# Patient Record
Sex: MALE | Race: WHITE
[De-identification: names, ages, dates, MRNs, and addresses within clinical notes are randomized per-mention and may not be internally consistent; named-entity substitution may affect disease eponyms.]

---

## 2018-05-25 ENCOUNTER — HOSPITAL ENCOUNTER (INPATIENT)
Dept: HOSPITAL 74 - FER | Age: 53
LOS: 6 days | Discharge: HOME | DRG: 190 | End: 2018-05-31
Attending: SPECIALIST | Admitting: SPECIALIST
Payer: COMMERCIAL

## 2018-05-25 VITALS — BODY MASS INDEX: 30.7 KG/M2

## 2018-05-25 DIAGNOSIS — R00.0: ICD-10-CM

## 2018-05-25 DIAGNOSIS — J18.9: ICD-10-CM

## 2018-05-25 DIAGNOSIS — K21.9: ICD-10-CM

## 2018-05-25 DIAGNOSIS — E87.1: ICD-10-CM

## 2018-05-25 DIAGNOSIS — J45.901: ICD-10-CM

## 2018-05-25 DIAGNOSIS — F17.210: ICD-10-CM

## 2018-05-25 DIAGNOSIS — F10.239: ICD-10-CM

## 2018-05-25 DIAGNOSIS — E78.5: ICD-10-CM

## 2018-05-25 DIAGNOSIS — J44.1: Primary | ICD-10-CM

## 2018-05-25 DIAGNOSIS — J20.9: ICD-10-CM

## 2018-05-25 DIAGNOSIS — B37.0: ICD-10-CM

## 2018-05-25 DIAGNOSIS — R79.89: ICD-10-CM

## 2018-05-25 LAB
ALBUMIN SERPL-MCNC: 4.4 G/DL (ref 3.5–5)
ALP SERPL-CCNC: 49 U/L (ref 32–92)
ALT SERPL-CCNC: 47 U/L (ref 10–40)
ANION GAP SERPL CALC-SCNC: 7 MMOL/L (ref 8–16)
AST SERPL-CCNC: 70 U/L (ref 10–42)
BASOPHILS # BLD: 1.8 % (ref 0–2)
BILIRUB SERPL-MCNC: 1.1 MG/DL (ref 0.2–1)
BUN SERPL-MCNC: 14 MG/DL (ref 7–18)
CALCIUM SERPL-MCNC: 9 MG/DL (ref 8.4–10.2)
CHLORIDE SERPL-SCNC: 101 MMOL/L (ref 98–107)
CO2 SERPL-SCNC: 26 MMOL/L (ref 22–28)
CREAT SERPL-MCNC: 1.2 MG/DL (ref 0.6–1.3)
DEPRECATED RDW RBC AUTO: 12.1 % (ref 11.9–15.9)
EOSINOPHIL # BLD: 2.1 % (ref 0–4.5)
GLUCOSE SERPL-MCNC: 151 MG/DL (ref 74–106)
HCT VFR BLD CALC: 45.7 % (ref 35.4–49)
HGB BLD-MCNC: 15.9 GM/DL (ref 11.7–16.9)
LYMPHOCYTES # BLD: 17.7 % (ref 8–40)
MAGNESIUM SERPL-MCNC: 2 MG/DL (ref 1.8–2.4)
MCH RBC QN AUTO: 31.7 PG (ref 25.7–33.7)
MCHC RBC AUTO-ENTMCNC: 34.9 G/DL (ref 32–35.9)
MCV RBC: 90.9 FL (ref 80–96)
MONOCYTES # BLD AUTO: 10.3 % (ref 3.8–10.2)
NEUTROPHILS # BLD: 68.1 % (ref 42.8–82.8)
PLATELET # BLD AUTO: 196 K/MM3 (ref 134–434)
PMV BLD: 8.7 FL (ref 7.5–11.1)
POTASSIUM SERPLBLD-SCNC: 3.7 MMOL/L (ref 3.5–5.1)
PROT SERPL-MCNC: 7.1 G/DL (ref 6.4–8.3)
RBC # BLD AUTO: 5.02 M/MM3 (ref 4–5.6)
SODIUM SERPL-SCNC: 134 MMOL/L (ref 136–145)
WBC # BLD AUTO: 10.4 K/MM3 (ref 4–10.8)

## 2018-05-25 PROCEDURE — HZ2ZZZZ DETOXIFICATION SERVICES FOR SUBSTANCE ABUSE TREATMENT: ICD-10-PCS | Performed by: SPECIALIST

## 2018-05-25 RX ADMIN — PANTOPRAZOLE SODIUM SCH MG: 40 TABLET, DELAYED RELEASE ORAL at 20:41

## 2018-05-25 RX ADMIN — METHYLPREDNISOLONE SODIUM SUCCINATE SCH: 40 INJECTION, POWDER, FOR SOLUTION INTRAMUSCULAR; INTRAVENOUS at 21:15

## 2018-05-25 RX ADMIN — BUDESONIDE AND FORMOTEROL FUMARATE DIHYDRATE SCH PUFF: 80; 4.5 AEROSOL RESPIRATORY (INHALATION) at 22:16

## 2018-05-25 NOTE — PDOC
History of Present Illness





- General


History Source: Patient, Family


Exam Limitations: No Limitations





<Onel Arteaga - Last Filed: 05/25/18 16:31>





- General


History Source: Patient, Family, Old Records


Exam Limitations: No Limitations





- History of Present Illness


Initial Comments: 


05/25/18 16:10


The patient is a 52 year old male with a past medical history of smoking, asthma

, and hyperlipidemia who presents to the emergency department with difficulty 

breathing, cough, and abdominal pain for 1 day. The patient states that 4 weeks 

ago he had similar symptoms and went to his primary doctor, Dr. Gibbons. At 

that time he was prescribed a course of antibiotics, a Z-precious for 3 days, and 

prednisone for 6 days. He reports that his symptoms improved but never 

completely resolved. Today he reports that he woke up with difficulty breathing

, cough, and abdominal pain. He describes his abdominal pain as acuña along 

the inferior border of his left ribs. 





<Aaron Singh - Last Filed: 05/25/18 17:16>





- General


Chief Complaint: Shortness of Breath


Stated Complaint: COUGH,


Time Seen by Provider: 05/25/18 14:34





Past History





- Past Medical History


Asthma: Yes


COPD: No


Psychiatric Problems: Yes (drug and etoh abuse)





- Suicide/Smoking/Psychosocial Hx


Smoking Status: Yes


Smoking History: Current every day smoker


Have you smoked in the past 12 months: Yes


Number of Cigarettes Smoked Daily: 30


Information on smoking cessation initiated: Yes


'Breaking Loose' booklet given: 05/25/18


Hx Alcohol Use: Yes (gallon wine per day)


Drug/Substance Use Hx: Yes (xanax, cocaine)


Substance Use Type: Alcohol, Cocaine, Tranquilizers





<Onel Arteaga - Last Filed: 05/25/18 16:31>





<Aaron Singh - Last Filed: 05/25/18 17:16>





- Past Medical History


Allergies/Adverse Reactions: 


 Allergies











Allergy/AdvReac Type Severity Reaction Status Date / Time


 


No Known Allergies Allergy   Verified 05/25/18 14:34











Home Medications: 


Ambulatory Orders





Albuterol Sulfate Inhaler - [Ventolin HFA Inhaler -] 2 inh PO Q4H PRN #1 inh 03/

09/15 


Fluticasone/Salmeterol [Advair Hfa 230-21 Mcg Inhaler] 1 inh PO BID #1 inhaler 

03/09/15 


Montelukast Sodium [Singulair] 10 mg PO DAILY 05/25/18 











**Review of Systems





- Review of Systems


Able to Perform ROS?: Yes


Comments:: 


05/25/18 16:10


GENERAL/CONSTITUTIONAL: No fever or chills. No weakness.


HEAD, EYES, EARS, NOSE AND THROAT: No change in vision. No ear pain or 

discharge. No sore throat.


CARDIOVASCULAR: No chest pain.


RESPIRATORY: (+) cough, difficulty breathing.


GASTROINTESTINAL: (+) Abdominal pain. No nausea, vomiting, diarrhea or 

constipation.


GENITOURINARY: No dysuria, frequency, or change in urination.


MUSCULOSKELETAL: No joint or muscle swelling or pain. No neck or back pain.


SKIN: No rash


NEUROLOGIC: No headache, vertigo, loss of consciousness, or change in strength/

sensation.


ENDOCRINE: No increased thirst. No abnormal weight change.


HEMATOLOGIC/LYMPHATIC: No anemia, easy bleeding, or history of blood clots.


ALLERGIC/IMMUNOLOGIC: No hives or skin allergy.








<Aaron Singh - Last Filed: 05/25/18 17:16>





*Physical Exam





- Vital Signs


 Last Vital Signs











Temp Pulse Resp BP Pulse Ox


 


 98.4 F   103 H  24   126/82   98 


 


 05/25/18 14:33  05/25/18 14:33  05/25/18 14:33  05/25/18 14:33  05/25/18 14:49














<Onel Arteaga - Last Filed: 05/25/18 16:31>





- Vital Signs


 Last Vital Signs











Temp Pulse Resp BP Pulse Ox


 


 98.4 F   111 H  22   121/81   98 


 


 05/25/18 14:33  05/25/18 16:03  05/25/18 16:03  05/25/18 16:03  05/25/18 16:03














- Physical Exam


Comments: 


05/25/18 16:11


GENERAL: Awake, alert, and fully oriented, in no acute distress


HEAD: No signs of trauma


EYES: PERRLA, EOMI, sclera anicteric, conjunctiva clear


ENT: Auricles normal inspection, hearing grossly normal, nares patent, 

oropharynx clear without exudates. Moist mucosa


NECK: Normal ROM, supple, no lymphadenopathy, JVD, or masses


LUNGS: (+) Diffuse expiratory wheezes bilaterally, Mildly tachypneic  


HEART: Regular rate and rhythm, normal S1 and S2, no murmurs, rubs or gallops


ABDOMEN: Soft, nontender, normoactive bowel sounds.  No guarding, no rebound.  

No masses


EXTREMITIES: Normal range of motion, no edema.  No clubbing or cyanosis. No 

cords, erythema, or tenderness


NEUROLOGICAL: Cranial nerves II through XII grossly intact.  Normal speech, 

normal gait


SKIN: Warm, Dry, normal turgor, no rashes or lesions noted.





<Aaron Singh - Last Filed: 05/25/18 17:16>





**Heart Score/ECG Review


  ** #1


ECG reviewed & interpreted by me at: 15:35





05/25/18 16:10


, no std/tree, T wave flat III, normal axis, normal intervals,  

msec





<Onel Arteaga - Last Filed: 05/25/18 16:31>





ED Treatment Course





- LABORATORY


CBC & Chemistry Diagram: 


 05/25/18 15:49





 05/25/18 15:49





- ADDITIONAL ORDERS


Additional order review: 


 











  05/25/18





  15:49


 


RBC  5.02


 


MCV  90.9


 


MCHC  34.9


 


RDW  12.1


 


MPV  8.7


 


Neutrophils %  68.1


 


Lymphocytes %  17.7


 


Monocytes %  10.3 H


 


Eosinophils %  2.1


 


Basophils %  1.8














- RADIOLOGY


Radiology Studies Ordered: 














 Category Date Time Status


 


 CHEST X-RAY PORTABLE* [RAD] Stat Radiology  05/25/18 14:53 Taken














- Medications


Given in the ED: 


ED Medications














Discontinued Medications














Generic Name Dose Route Start Last Admin





  Trade Name Freq  PRN Reason Stop Dose Admin


 


Albuterol/Ipratropium  3 amp  05/25/18 14:53  05/25/18 14:54





  Duoneb -  NEB  05/25/18 14:54  3 amp





  ONCE ONE   Administration





     





     





     





     














<Onel Arteaga - Last Filed: 05/25/18 16:31>





- LABORATORY


CBC & Chemistry Diagram: 


 05/25/18 15:49





 05/25/18 15:49





- ADDITIONAL ORDERS


Additional order review: 


 











  05/25/18





  15:49


 


RBC  5.02


 


MCV  90.9


 


MCHC  34.9


 


RDW  12.1


 


MPV  8.7


 


Neutrophils %  68.1


 


Lymphocytes %  17.7


 


Monocytes %  10.3 H


 


Eosinophils %  2.1


 


Basophils %  1.8














- RADIOLOGY


Radiograph Interpretation: 


05/25/18 17:16


EXAM#: TYPE/EXAM: RESULT: [id number] RAD/CHEST X-RAY PORTABLE* Indication: 

Cough. Wheezing. Technique: Single AP portable view of the chest. Comparison: 8/ 14/2014 chest x-ray. Findings: There is no evidence of acute infiltrate, 

pulmonary vascular congestion or pleural effusion. There is no definable 

pneumothorax. Normal size and contours of the cardiomediastinal silhouette. The 

thoracic aorta is mildly uncoiled. No abnormal deviation of the trachea. There 

is acromioclavicular arthropathy. Impression: No evidence of acute infiltrate, 

pulmonary vascular congestion or pleural effusion. Reported By: Magda Sanchez DO 05/25/18 1621 





- Medications


Given in the ED: 


ED Medications














Discontinued Medications














Generic Name Dose Route Start Last Admin





  Trade Name Freq  PRN Reason Stop Dose Admin


 


Albuterol/Ipratropium  3 amp  05/25/18 14:53  05/25/18 14:54





  Duoneb -  NEB  05/25/18 14:54  3 amp





  ONCE ONE   Administration





     





     





     





     


 


Chlordiazepoxide HCl  50 mg  05/25/18 15:59  05/25/18 16:02





  Librium -  PO  05/25/18 16:00  50 mg





  ONCE ONE   Administration





     





     





     





     


 


Methylprednisolone Sodium Succinate  125 mg  05/25/18 15:59  05/25/18 16:02





  Solu-Medrol -  IVPUSH  05/25/18 16:00  125 mg





  ONCE ONE   Administration





     





     





     





     


 


Prednisone  60 mg  05/25/18 14:53  05/25/18 15:55





  Deltasone -  PO  05/25/18 14:54  Not Given





  ONCE ONE   





     





     





     





     














<Aaron Singh - Last Filed: 05/25/18 17:16>





Medical Decision Making





- Medical Decision Making





05/25/18 16:06





A portion of this note was documented by scribe services under my direction. I 

have reviewed the details of the note, within reason, and agree with the 

documentation with the following case summary and management plan written by 

me. 





Patient treated in the ED.





Nursing notes are reviewed and incorporated into the medical decision-making.


Vital signs reviewed.





Peripheral IV access obtained by the nurse, laboratory studies are drawn and 

sent, reviewed and interpreted by myself. 





 Vital Signs











Temp Pulse Resp BP Pulse Ox


 


 98.4 F   111 H  22   121/81   98 


 


 05/25/18 14:33  05/25/18 16:03  05/25/18 16:03  05/25/18 16:03  05/25/18 16:03








52-year-old male with past medical history of alcohol abuse, hyperlipidemia, 

asthma, current smoker presents with worsening coughing and wheezing. The 

patient reports one month of the symptoms. One month ago, the patient was 

prescribed prednisone and was taking albuterol with some improvement in 

symptoms. However in the last 2 days, the patient has reported worsening 

productive cough with difficulty breathing and reproducible chest pain with 

cough. Denies fevers or chills. States has become increasingly more difficult 

to breathe despite taking albuterol.





I suspect patient likely having an asthma or COPD exacerbation with probable 

underlying pneumonia. We'll start doing nebs in addition to steroids and 

antibiotics. Patient denies alcohol draws at this time but will treat him 

prophylactically with Librium. If the symptoms do not improve, the patient will 

need to be admitted to the hospital for further evaluation.


05/25/18 16:31





Chest xray reviewed. No acute findings.





 CBC, BMP





 05/25/18 15:49 





 05/25/18 15:49 





 CMP











Sodium  134 mmol/L (136-145)  L  05/25/18  15:49    


 


Potassium  3.7 mmol/L (3.5-5.1)   05/25/18  15:49    


 


Chloride  101 mmol/L ()   05/25/18  15:49    


 


Carbon Dioxide  26 mmol/L (22-28)   05/25/18  15:49    


 


Anion Gap  7  (8-16)  L  05/25/18  15:49    


 


BUN  14 mg/dl (7-18)   05/25/18  15:49    


 


Creatinine  1.2 mg/dl (0.6-1.3)   05/25/18  15:49    


 


Creat Clearance w eGFR  > 60  (>60)   05/25/18  15:49    


 


Random Glucose  151 mg/dl ()  H  05/25/18  15:49    


 


Calcium  9.0 mg/dl (8.4-10.2)   05/25/18  15:49    


 


Magnesium  2.0 mg/dL (1.8-2.4)   05/25/18  15:49    


 


Total Bilirubin  1.1 mg/dl (0.2-1.0)  H  05/25/18  15:49    


 


AST  70 U/L (10-42)  H  05/25/18  15:49    


 


ALT  47 U/L (10-40)  H  05/25/18  15:49    


 


Alkaline Phosphatase  49 U/L (32-92)   05/25/18  15:49    


 


Troponin I  < 0.03 ng/ml (0.00-0.06)   05/25/18  15:49    


 


Total Protein  7.1 g/dl (6.4-8.3)   05/25/18  15:49    


 


Albumin  4.4 g/dl (3.5-5.0)   05/25/18  15:49    








The patient continues to wheeze despite the treatments. Given the symptoms, 

decision was made to admit the patient. I had spoken with the patient, who 

agrees with admission to the hospital. Case discussed with Dr. Gibbons. Given 

that the patient will be on librium and with asthma/COPD exacerbation, decision 

was made to admit patient to telemetry at [facility].





Case discussed in detail with admitting physician including history, physical 

exam and ancillary studies.





Admitting physician has assumed care for the patient, will follow all pending 

diagnostics and will complete the evaluation and treatment.  





<Onel Arteaga - Last Filed: 05/25/18 16:31>





*DC/Admit/Observation/Transfer





- Discharge Dispostion


Decision to Admit order: Yes





<Onel Arteaga - Last Filed: 05/25/18 16:31>





- Attestations


Scribe Attestion: 


05/25/18 16:11


Documentation prepared by Aaron Singh, acting as medical scribe for Onel Arteaga MD.





<Aaron Singh - Last Filed: 05/25/18 17:16>


Diagnosis at time of Disposition: 


Acute asthma exacerbation


Qualifiers:


 Asthma severity: unspecified severity Asthma persistence: unspecified 

Qualified Code(s): J45.901 - Unspecified asthma with (acute) exacerbation





PNA (pneumonia)


Qualifiers:


 Pneumonia type: due to unspecified organism Laterality: unspecified laterality 

Lung location: unspecified part of lung Qualified Code(s): J18.9 - Pneumonia, 

unspecified organism








- Discharge Dispostion


Condition at time of disposition: Stable

## 2018-05-25 NOTE — HP
Admitting History and Physical





- Primary Care Physician


PCP: José Gibbons





- Admission


Chief Complaint: Cough, wheezing


History of Present Illness: 





Pt with signigificant Hx/o Asthma, smoker, alcohol abuse (one gallon of wine/ 

beer per day) started to cough, wheeze 2 days ago; pt got worse and today came 

to ER were was found to have acute asthma exacerbation, probable PNA.


History Source: Patient





- Past Medical History


Cardiovascular: Yes: Hyperlipdemia


Pulmonary: Yes: Asthma





- Smoking History


Smoking history: Current every day smoker


Have you smoked in the past 12 months: Yes


Aproximately how many cigarettes per day: 30





- Alcohol/Substance Use


Hx Alcohol Use: Yes (gallon wine per day)





Home Medications





- Allergies


Allergies/Adverse Reactions: 


 Allergies











Allergy/AdvReac Type Severity Reaction Status Date / Time


 


No Known Allergies Allergy   Verified 05/25/18 14:34














- Home Medications


Home Medications: 


Ambulatory Orders





Albuterol Sulfate Inhaler - [Ventolin HFA Inhaler -] 2 inh PO Q4H PRN #1 inh 03/

09/15 


Fluticasone/Salmeterol [Advair Hfa 230-21 Mcg Inhaler] 1 inh PO BID #1 inhaler 

03/09/15 


Montelukast Sodium [Singulair] 10 mg PO DAILY 05/25/18 











Review of Systems





- Review of Systems


Constitutional: reports: Chills, Diaphoresis, Weakness


Eyes: denies: Blurred Vision


HENT: reports: Difficult Swallowing.  denies: Ear Discharge, Ear Pain, Nasal 

Congestion, Throat Pain


Neck: denies: Pain on Movement, Stiffness


Cardiovascular: reports: Palpitations (heart betting fast; no dizziness or SOB 

or CP  associated with palpitations).  denies: Chest Pain, Edema


Respiratory: reports: Cough, Wheezing


Gastrointestinal: reports: Abdominal Pain (LUQ when coughing)


Genitourinary: denies: Burning, Discharge


Musculoskeletal: reports: Joint Pain (from falling when gets drunk).  denies: 

Back Pain


Integumentary: reports: Bruising (from falling when gets drunk).  denies: Eczema

, Rash


Neurological: denies: Change in LOC, Change in Speech, Incoordination, Numbness


Endocrine: denies: Excessive Sweating, Intolerance to Cold


Hematology/Lymphatic: denies: Easily Bruised, Excessive Bleeding


Psychiatric: denies: Altered Sleep Pattern, Anxiety, Hallucinations





Physical Examination


Vital Signs: 


 Vital Signs











Temperature  99.4 F   05/25/18 19:25


 


Pulse Rate  105 H  05/25/18 19:25


 


Respiratory Rate  20   05/25/18 19:25


 


Blood Pressure  134/78   05/25/18 19:25


 


O2 Sat by Pulse Oximetry (%)  97   05/25/18 18:21











Constitutional: Yes: No Distress, Calm


Eyes: Yes: Conjunctiva Clear, EOM Intact


HENT: Yes: Thrush, Other.  No: Drooling, Rhinnorhea


Neck: Yes: Trachea Midline.  No: Lymphadenopathy


Cardiovascular: Yes: Tachycardia, S1, S2


Respiratory: Yes: Regular, CTA Bilaterally, Wheezes


Gastrointestinal: Yes: Normal Bowel Sounds, Soft.  No: Tenderness


...Rectal Exam: Yes: Deferred


Extremities: Yes: Other (multiple bruses on the legs, arma)


Edema: No


Neurological: Yes: Alert, Oriented, Other (symmetric motor and sensory 

examnation in UE/ LE/ face)


...Motor Strength: WNL


Psychiatric: Yes: Alert, Oriented


Labs: 


 CBC, BMP





 05/25/18 15:49 





 05/25/18 15:49 











Imaging





- Results


Chest X-ray: Report Reviewed





Problem List





- Problems


(1) Alcohol abuse


Code(s): F10.10 - ALCOHOL ABUSE, UNCOMPLICATED   





(2) Acute asthma exacerbation


Code(s): J45.901 - UNSPECIFIED ASTHMA WITH (ACUTE) EXACERBATION   


Qualifiers: 


   Asthma severity: unspecified severity   Asthma persistence: unspecified   

Qualified Code(s): J45.901 - Unspecified asthma with (acute) exacerbation   





(3) PNA (pneumonia)


Code(s): J18.9 - PNEUMONIA, UNSPECIFIED ORGANISM   


Qualifiers: 


   Pneumonia type: due to unspecified organism   Laterality: unspecified 

laterality   Lung location: unspecified part of lung   Qualified Code(s): J18.9 

- Pneumonia, unspecified organism   





(4) Tachycardia


Code(s): R00.0 - TACHYCARDIA, UNSPECIFIED   





(5) Thrush, oral


Code(s): B37.0 - CANDIDAL STOMATITIS   





(6) Hyponatremia


Assessment/Plan: 


borderline


Code(s): E87.1 - HYPO-OSMOLALITY AND HYPONATREMIA   





(7) GERD (gastroesophageal reflux disease)


Code(s): K21.9 - GASTRO-ESOPHAGEAL REFLUX DISEASE WITHOUT ESOPHAGITIS   





(8) Elevated LFTs


Code(s): R79.89 - OTHER SPECIFIED ABNORMAL FINDINGS OF BLOOD CHEMISTRY   





Assessment/Plan





Admit to monitor bed (pt with respiratory disease, requires Librium for alcohol 

withdrawing; to monitor for respiratory decompensation)


IV solu-medrol


DUoned nebulized


Pulmonary consult


Nystatin oral solution


AM labs


Case was d/w pt's nurse

## 2018-05-26 LAB
ALBUMIN SERPL-MCNC: 3.6 G/DL (ref 3.4–5)
ALP SERPL-CCNC: 52 U/L (ref 45–117)
ALT SERPL-CCNC: 49 U/L (ref 12–78)
ANION GAP SERPL CALC-SCNC: 9 MMOL/L (ref 8–16)
AST SERPL-CCNC: 43 U/L (ref 15–37)
BILIRUB SERPL-MCNC: 1 MG/DL (ref 0.2–1)
BUN SERPL-MCNC: 13 MG/DL (ref 7–18)
CALCIUM SERPL-MCNC: 8.5 MG/DL (ref 8.5–10.1)
CHLORIDE SERPL-SCNC: 106 MMOL/L (ref 98–107)
CO2 SERPL-SCNC: 27 MMOL/L (ref 21–32)
CREAT SERPL-MCNC: 1.1 MG/DL (ref 0.7–1.3)
DEPRECATED RDW RBC AUTO: 12.8 % (ref 11.9–15.9)
GLUCOSE SERPL-MCNC: 260 MG/DL (ref 74–106)
HCT VFR BLD CALC: 43.8 % (ref 35.4–49)
HGB BLD-MCNC: 14.9 GM/DL (ref 11.7–16.9)
MCH RBC QN AUTO: 31.7 PG (ref 25.7–33.7)
MCHC RBC AUTO-ENTMCNC: 34 G/DL (ref 32–35.9)
MCV RBC: 93.1 FL (ref 80–96)
PLATELET # BLD AUTO: 152 K/MM3 (ref 134–434)
PMV BLD: 9.2 FL (ref 7.5–11.1)
POTASSIUM SERPLBLD-SCNC: 4.1 MMOL/L (ref 3.5–5.1)
PROT SERPL-MCNC: 6.8 G/DL (ref 6.4–8.2)
RBC # BLD AUTO: 4.7 M/MM3 (ref 4–5.6)
SODIUM SERPL-SCNC: 142 MMOL/L (ref 136–145)
WBC # BLD AUTO: 7.5 K/MM3 (ref 4–10)

## 2018-05-26 RX ADMIN — NYSTATIN SCH UNITS: 100000 SUSPENSION ORAL at 17:26

## 2018-05-26 RX ADMIN — BUDESONIDE AND FORMOTEROL FUMARATE DIHYDRATE SCH PUFF: 80; 4.5 AEROSOL RESPIRATORY (INHALATION) at 09:17

## 2018-05-26 RX ADMIN — NICOTINE SCH MG: 21 PATCH TRANSDERMAL at 13:48

## 2018-05-26 RX ADMIN — NYSTATIN SCH UNITS: 100000 SUSPENSION ORAL at 11:22

## 2018-05-26 RX ADMIN — IPRATROPIUM BROMIDE AND ALBUTEROL SULFATE SCH: .5; 3 SOLUTION RESPIRATORY (INHALATION) at 00:00

## 2018-05-26 RX ADMIN — INSULIN ASPART SCH: 100 INJECTION, SOLUTION INTRAVENOUS; SUBCUTANEOUS at 20:05

## 2018-05-26 RX ADMIN — METHYLPREDNISOLONE SODIUM SUCCINATE SCH MG: 40 INJECTION, POWDER, FOR SOLUTION INTRAMUSCULAR; INTRAVENOUS at 16:16

## 2018-05-26 RX ADMIN — IPRATROPIUM BROMIDE AND ALBUTEROL SULFATE SCH AMP: .5; 3 SOLUTION RESPIRATORY (INHALATION) at 20:25

## 2018-05-26 RX ADMIN — BUDESONIDE AND FORMOTEROL FUMARATE DIHYDRATE SCH PUFF: 80; 4.5 AEROSOL RESPIRATORY (INHALATION) at 21:11

## 2018-05-26 RX ADMIN — METHYLPREDNISOLONE SODIUM SUCCINATE SCH MG: 40 INJECTION, POWDER, FOR SOLUTION INTRAMUSCULAR; INTRAVENOUS at 09:09

## 2018-05-26 RX ADMIN — NYSTATIN SCH UNITS: 100000 SUSPENSION ORAL at 00:44

## 2018-05-26 RX ADMIN — IPRATROPIUM BROMIDE AND ALBUTEROL SULFATE SCH AMP: .5; 3 SOLUTION RESPIRATORY (INHALATION) at 15:44

## 2018-05-26 RX ADMIN — INSULIN ASPART SCH: 100 INJECTION, SOLUTION INTRAVENOUS; SUBCUTANEOUS at 13:48

## 2018-05-26 RX ADMIN — INSULIN ASPART SCH: 100 INJECTION, SOLUTION INTRAVENOUS; SUBCUTANEOUS at 23:49

## 2018-05-26 RX ADMIN — IPRATROPIUM BROMIDE AND ALBUTEROL SULFATE SCH AMP: .5; 3 SOLUTION RESPIRATORY (INHALATION) at 06:00

## 2018-05-26 RX ADMIN — INSULIN ASPART SCH UNIT: 100 INJECTION, SOLUTION INTRAVENOUS; SUBCUTANEOUS at 16:16

## 2018-05-26 RX ADMIN — NYSTATIN SCH UNITS: 100000 SUSPENSION ORAL at 05:46

## 2018-05-26 RX ADMIN — AZITHROMYCIN DIHYDRATE SCH MLS/HR: 500 INJECTION, POWDER, LYOPHILIZED, FOR SOLUTION INTRAVENOUS at 15:51

## 2018-05-26 RX ADMIN — ACETAMINOPHEN PRN MG: 325 TABLET ORAL at 09:40

## 2018-05-26 RX ADMIN — NYSTATIN SCH UNITS: 100000 SUSPENSION ORAL at 23:20

## 2018-05-26 RX ADMIN — METHYLPREDNISOLONE SODIUM SUCCINATE SCH MG: 40 INJECTION, POWDER, FOR SOLUTION INTRAMUSCULAR; INTRAVENOUS at 21:10

## 2018-05-26 RX ADMIN — PANTOPRAZOLE SODIUM SCH MG: 40 TABLET, DELAYED RELEASE ORAL at 09:09

## 2018-05-26 RX ADMIN — MONTELUKAST SODIUM SCH MG: 10 TABLET, COATED ORAL at 21:49

## 2018-05-26 RX ADMIN — METHYLPREDNISOLONE SODIUM SUCCINATE SCH MG: 40 INJECTION, POWDER, FOR SOLUTION INTRAMUSCULAR; INTRAVENOUS at 02:10

## 2018-05-26 NOTE — EKG
Test Reason : 

Blood Pressure : ***/*** mmHG

Vent. Rate : 103 BPM     Atrial Rate : 103 BPM

   P-R Int : 126 ms          QRS Dur : 082 ms

    QT Int : 348 ms       P-R-T Axes : 065 020 042 degrees

   QTc Int : 455 ms

 

SINUS TACHYCARDIA

OTHERWISE NORMAL ECG

WHEN COMPARED WITH ECG OF 14-AUG-2014 17:20,

NO SIGNIFICANT CHANGE WAS FOUND

Confirmed by PATRICIA CARPIO MD ([id number]) on 5/26/2018 8:45:27 AM

 

Referred By: MD IBRAHIM           Confirmed By:PATRICIA CARPIO MD

## 2018-05-26 NOTE — CON.PULM
Consult


Consult Specialty:: PULMONARY


Referred by:: Dr. Gibbons


Reason for Consultation:: COPD





- History of Present Illness


Chief Complaint: shortness of breath


History of Present Illness: 





53yo male with h/o COPD, alcohol abuse, smoker who was admitted with worsening 

shortness of breath x 2 days. Reports a nonproductive cough and wheezing. No 

fevers, chills or sweats. +left upper quadrant pain with coughing. Used his 

inhalers without relief, could not produce enough inspiratory effort to get his 

medications from his inhalers. Started smoking at age 11, smokes on average 1.5 

PPD. Last CT chest in 2016 with a stable 4mm RML nodule.








- History Source


History Provided By: Patient, Medical Record


Limitations to Obtaining History: No Limitations





- Past Medical History


Cardio/Vascular: Yes: Hyperlipdemia


Pulmonary: Yes: Asthma





- Alcohol/Substance Use


Hx Alcohol Use: Yes (gallon wine per day)





- Smoking History


Smoking history: Current every day smoker


Have you smoked in the past 12 months: Yes


Aproximately how many cigarettes per day: 30





Home Medications





- Allergies


Allergies/Adverse Reactions: 


 Allergies











Allergy/AdvReac Type Severity Reaction Status Date / Time


 


No Known Allergies Allergy   Verified 05/25/18 14:34














- Home Medications


Home Medications: 


Ambulatory Orders





Albuterol Sulfate Inhaler - [Ventolin HFA Inhaler -] 2 inh PO Q4H PRN #1 inh 03/

09/15 


Fluticasone/Salmeterol [Advair Hfa 230-21 Mcg Inhaler] 1 inh PO BID #1 inhaler 

03/09/15 


Montelukast Sodium [Singulair] 10 mg PO DAILY 05/25/18 











Review of Systems





- Review of Systems


Constitutional: reports: Weakness.  denies: Chills, Fever


Eyes: denies: Recent Change in Vision


HENT: denies: Nasal Congestion, Throat Pain


Neck: denies: Stiffness, Tenderness


Cardiovascular: reports: Shortness of Breath.  denies: Chest Pain, Edema, 

Palpitations


Respiratory: reports: Cough, Exercise Intolerance, SOB, SOB on Exertion, 

Wheezing.  denies: Hemoptysis


Gastrointestinal: reports: Abdominal Pain.  denies: Nausea, Vomiting


Genitourinary: denies: Dysuria, Hematuria


Neurological: denies: Dizziness, Headache


Endocrine: denies: Unexplained Weight Loss





Physical Exam


Vital Sings: 


 Vital Signs











Temperature  97.5 F L  05/26/18 05:29


 


Pulse Rate  84   05/26/18 05:29


 


Respiratory Rate  20   05/26/18 05:29


 


Blood Pressure  105/74   05/26/18 05:29


 


O2 Sat by Pulse Oximetry (%)  96   05/25/18 19:25











Constitutional: Yes: Calm


Eyes: Yes: Conjunctiva Clear, EOM Intact


HENT: Yes: Atraumatic, Normocephalic


Neck: Yes: Supple, Trachea Midline


Cardiovascular: Yes: Regular Rate and Rhythm


Respiratory: Yes: Poor Air Entry, Rhonchi, Wheezes, Other (prolonged expiration)


...Clubbing: No


Gastrointestinal: Yes: Normal Bowel Sounds, Soft.  No: Tenderness


Edema: No


Neurological: Yes: Alert, Oriented


Labs: 


 CBC, BMP





 05/26/18 06:10 





 05/26/18 06:10 











Imaging





- Results


Chest X-ray: Report Reviewed, Image Reviewed (no infiltrates)





Problem List





- Problems


(1) COPD with acute exacerbation


Code(s): J44.1 - CHRONIC OBSTRUCTIVE PULMONARY DISEASE W (ACUTE) EXACERBATION   





(2) Alcohol abuse


Code(s): F10.10 - ALCOHOL ABUSE, UNCOMPLICATED   





(3) Elevated LFTs


Code(s): R79.89 - OTHER SPECIFIED ABNORMAL FINDINGS OF BLOOD CHEMISTRY   





(4) Smoker


Code(s): F17.200 - NICOTINE DEPENDENCE, UNSPECIFIED, UNCOMPLICATED   





Assessment/Plan





Acute COPD Exacerbation


Alcohol Abuse


Elevated LFTs likely above


Smoker





-  IV medrol


-  inhaled bronchodilators standing and PRN


-  O2 as needed


-  IVF


-  trend LFTs


-  smoking cessation


-  DVT prophylaxis





Thank you for this consult


Tone Razo MD

## 2018-05-26 NOTE — PN
Progress Note, Physician


History of Present Illness: 





Pt feels anxious.


Pt w/o SOB while in bed, no CP/ palpitations/ dizziness/ abd pain





- Current Medication List


Current Medications: 


Active Medications





Acetaminophen (Tylenol -)  650 mg PO Q6H PRN


   PRN Reason: PAIN


Albuterol/Ipratropium (Duoneb -)  1 amp NEB Q6H [facility]


   Stop: 05/26/18 11:46


   Last Admin: 05/26/18 06:00 Dose:  1 amp


Budesonide/Formoterol Fumarate (Symbicort 80/4.5mcg -)  2 puff IH BID [facility]


   Last Admin: 05/26/18 09:17 Dose:  2 puff


Chlordiazepoxide HCl (Librium -)  10 mg PO Q6HPO [facility]


   Last Admin: 05/26/18 05:46 Dose:  10 mg


Chlordiazepoxide HCl (Librium -)  10 mg PO Q6H PRN


   PRN Reason: WITHDRAWAL(CONT SUBST)


Methylprednisolone Sodium Succinate (Solu-Medrol -)  40 mg IVPUSH Q6H-IV [facility]


   Last Admin: 05/26/18 09:09 Dose:  40 mg


Montelukast Sodium (Singulair -)  10 mg PO HS [facility]


Nystatin (Nystatin Oral Suspension -)  500,000 units PO Q6HPO [facility]


   Last Admin: 05/26/18 05:46 Dose:  500,000 units


Pantoprazole Sodium (Protonix -)  40 mg PO DAILY [facility]


   Last Admin: 05/26/18 09:09 Dose:  40 mg











- Objective


Vital Signs: 


 Vital Signs











Temperature  97.5 F L  05/26/18 05:29


 


Pulse Rate  84   05/26/18 05:29


 


Respiratory Rate  20   05/26/18 05:29


 


Blood Pressure  105/74   05/26/18 05:29


 


O2 Sat by Pulse Oximetry (%)  96   05/25/18 19:25











Constitutional: Yes: No Distress, Calm, Anxious


Cardiovascular: Yes: Regular Rate and Rhythm, S1, S2


Respiratory: Yes: Regular, Rhonchi, Wheezes


Gastrointestinal: Yes: Normal Bowel Sounds, Soft, Abdomen, Obese.  No: 

Tenderness


Neurological: Yes: Alert, Oriented, Other (slight hands tremor)


Labs: 


 CBC, BMP





 05/26/18 06:10 





 05/26/18 06:10 











Problem List





- Problems


(1) Alcohol abuse


Code(s): F10.10 - ALCOHOL ABUSE, UNCOMPLICATED   





(2) Acute asthma exacerbation


Code(s): J45.901 - UNSPECIFIED ASTHMA WITH (ACUTE) EXACERBATION   


Qualifiers: 


   Asthma severity: unspecified severity   Asthma persistence: unspecified   

Qualified Code(s): J45.901 - Unspecified asthma with (acute) exacerbation   





(3) PNA (pneumonia)


Code(s): J18.9 - PNEUMONIA, UNSPECIFIED ORGANISM   


Qualifiers: 


   Pneumonia type: due to unspecified organism   Laterality: unspecified 

laterality   Lung location: unspecified part of lung   Qualified Code(s): J18.9 

- Pneumonia, unspecified organism   





(4) Tachycardia


Code(s): R00.0 - TACHYCARDIA, UNSPECIFIED   





(5) Thrush, oral


Code(s): B37.0 - CANDIDAL STOMATITIS   





(6) Hyponatremia


Code(s): E87.1 - HYPO-OSMOLALITY AND HYPONATREMIA   





(7) GERD (gastroesophageal reflux disease)


Code(s): K21.9 - GASTRO-ESOPHAGEAL REFLUX DISEASE WITHOUT ESOPHAGITIS   





(8) Elevated LFTs


Code(s): R79.89 - OTHER SPECIFIED ABNORMAL FINDINGS OF BLOOD CHEMISTRY   





(9) Elevated fasting glucose


Assessment/Plan: 


while on IV steroids


Code(s): R73.01 - IMPAIRED FASTING GLUCOSE   





(10) Acute bronchitis


Code(s): J20.9 - ACUTE BRONCHITIS, UNSPECIFIED   





(11) COPD with acute exacerbation


Code(s): J44.1 - CHRONIC OBSTRUCTIVE PULMONARY DISEASE W (ACUTE) EXACERBATION   





Assessment/Plan





Admitted to monitor bed (pt with respiratory disease, requires Librium for 

alcohol withdrawing; to monitor for respiratory decompensation); to increase 

Librium dose.


IV solu-medrol


Duoned nebulized


Pulmonary consult appreciated


Nystatin oral solution


Nicotine patch


Azithromycin


AM labs


Case was d/w pt's nurse

## 2018-05-27 LAB
ANION GAP SERPL CALC-SCNC: 7 MMOL/L (ref 8–16)
BUN SERPL-MCNC: 16 MG/DL (ref 7–18)
CALCIUM SERPL-MCNC: 8.6 MG/DL (ref 8.5–10.1)
CHLORIDE SERPL-SCNC: 105 MMOL/L (ref 98–107)
CO2 SERPL-SCNC: 27 MMOL/L (ref 21–32)
CREAT SERPL-MCNC: 1 MG/DL (ref 0.7–1.3)
GLUCOSE SERPL-MCNC: 168 MG/DL (ref 74–106)
POTASSIUM SERPLBLD-SCNC: 4 MMOL/L (ref 3.5–5.1)
SODIUM SERPL-SCNC: 139 MMOL/L (ref 136–145)

## 2018-05-27 RX ADMIN — ALBUTEROL SULFATE PRN AMP: 2.5 SOLUTION RESPIRATORY (INHALATION) at 09:27

## 2018-05-27 RX ADMIN — METHYLPREDNISOLONE SODIUM SUCCINATE SCH MG: 40 INJECTION, POWDER, FOR SOLUTION INTRAMUSCULAR; INTRAVENOUS at 17:05

## 2018-05-27 RX ADMIN — ALBUTEROL SULFATE PRN AMP: 2.5 SOLUTION RESPIRATORY (INHALATION) at 17:20

## 2018-05-27 RX ADMIN — INSULIN ASPART SCH: 100 INJECTION, SOLUTION INTRAVENOUS; SUBCUTANEOUS at 12:42

## 2018-05-27 RX ADMIN — AZITHROMYCIN DIHYDRATE SCH MLS/HR: 500 INJECTION, POWDER, LYOPHILIZED, FOR SOLUTION INTRAVENOUS at 13:00

## 2018-05-27 RX ADMIN — PANTOPRAZOLE SODIUM SCH MG: 40 TABLET, DELAYED RELEASE ORAL at 09:24

## 2018-05-27 RX ADMIN — ACETAMINOPHEN PRN MG: 325 TABLET ORAL at 18:07

## 2018-05-27 RX ADMIN — INSULIN ASPART SCH: 100 INJECTION, SOLUTION INTRAVENOUS; SUBCUTANEOUS at 17:04

## 2018-05-27 RX ADMIN — METHYLPREDNISOLONE SODIUM SUCCINATE SCH MG: 40 INJECTION, POWDER, FOR SOLUTION INTRAMUSCULAR; INTRAVENOUS at 03:31

## 2018-05-27 RX ADMIN — NICOTINE SCH MG: 21 PATCH TRANSDERMAL at 09:24

## 2018-05-27 RX ADMIN — MONTELUKAST SODIUM SCH MG: 10 TABLET, COATED ORAL at 21:31

## 2018-05-27 RX ADMIN — ACETAMINOPHEN PRN MG: 325 TABLET ORAL at 05:33

## 2018-05-27 RX ADMIN — INSULIN ASPART SCH: 100 INJECTION, SOLUTION INTRAVENOUS; SUBCUTANEOUS at 04:02

## 2018-05-27 RX ADMIN — NYSTATIN SCH UNITS: 100000 SUSPENSION ORAL at 05:34

## 2018-05-27 RX ADMIN — BUDESONIDE AND FORMOTEROL FUMARATE DIHYDRATE SCH PUFF: 80; 4.5 AEROSOL RESPIRATORY (INHALATION) at 09:24

## 2018-05-27 RX ADMIN — NYSTATIN SCH UNITS: 100000 SUSPENSION ORAL at 17:05

## 2018-05-27 RX ADMIN — BUDESONIDE AND FORMOTEROL FUMARATE DIHYDRATE SCH PUFF: 80; 4.5 AEROSOL RESPIRATORY (INHALATION) at 21:31

## 2018-05-27 RX ADMIN — INSULIN ASPART SCH UNIT: 100 INJECTION, SOLUTION INTRAVENOUS; SUBCUTANEOUS at 09:39

## 2018-05-27 RX ADMIN — NYSTATIN SCH UNITS: 100000 SUSPENSION ORAL at 11:23

## 2018-05-27 RX ADMIN — METHYLPREDNISOLONE SODIUM SUCCINATE SCH MG: 40 INJECTION, POWDER, FOR SOLUTION INTRAMUSCULAR; INTRAVENOUS at 09:24

## 2018-05-27 RX ADMIN — ACETAMINOPHEN PRN MG: 325 TABLET ORAL at 11:22

## 2018-05-27 NOTE — CON.PSY
Psychiatry Consult


Chief Complaint: 52 year old  with a long history of Alcohol 

dependence and abuse seen for Psych consultation for DEpression.


Symptoms: reports: Depressed Mood, Anhedonia





- Previous Psychiatric Treatment


Outpatient: None


Inpatient: None





- Previous Substance Abuse Treatment


Inpatient: 2 or more prior admissions





- Reason for Previous Treatment


Reason for Previous Treatment: Alcohol Abuse





- Current Medications


Current Medications: 


Active Medications





Acetaminophen (Tylenol -)  650 mg PO Q6H PRN


   PRN Reason: PAIN


   Last Admin: 05/27/18 11:22 Dose:  650 mg


Albuterol Sulfate (Ventolin 0.083% Nebulizer Soln -)  1 amp NEB Q4H PRN


   PRN Reason: SHORT OF BREATH/WHEEZING


   Last Admin: 05/27/18 09:27 Dose:  1 amp


Budesonide/Formoterol Fumarate (Symbicort 80/4.5mcg -)  2 puff IH BID [facility]


   Last Admin: 05/27/18 09:24 Dose:  2 puff


Chlordiazepoxide HCl (Librium -)  25 mg PO Q6H PRN


   PRN Reason: WITHDRAWAL(CONT SUBST)


Chlordiazepoxide HCl (Librium -)  25 mg PO Q2N-MYD [facility]


   Stop: 05/29/18 10:59


   Last Admin: 05/27/18 11:45 Dose:  Not Given


Chlordiazepoxide HCl (Librium -)  20 mg PO F9C-ALA [facility]


   Stop: 05/31/18 10:59


Chlordiazepoxide HCl (Librium -)  10 mg PO H2A-LBS [facility]


   Stop: 06/02/18 10:59


Chlordiazepoxide HCl (Librium -)  10 mg PO 0300,1100,1900 [facility]


   Stop: 06/04/18 10:59


Chlordiazepoxide HCl (Librium -)  10 mg PO Q12H [facility]


   Stop: 06/06/18 10:59


Chlordiazepoxide HCl (Librium -)  10 mg PO Q24H [facility]


   Stop: 06/08/18 10:59


Azithromycin 500 mg/ Dextrose  250 mls @ 250 mls/hr IVPB DAILY [facility]


   Stop: 05/28/18 11:44


   Last Admin: 05/27/18 13:00 Dose:  250 mls/hr


Insulin Aspart (Novolog Vial Sliding Scale -)  1 vial SQ Q4H [facility]; Protocol


   Last Admin: 05/27/18 12:42 Dose:  Not Given


Methylprednisolone Sodium Succinate (Solu-Medrol -)  40 mg IVPUSH Q8H-IV [facility]


Montelukast Sodium (Singulair -)  10 mg PO HS [facility]


   Last Admin: 05/26/18 21:49 Dose:  10 mg


Nicotine (Nicoderm Patch -)  21 mg TD DAILY [facility]


   Last Admin: 05/27/18 09:24 Dose:  21 mg


Nystatin (Nystatin Oral Suspension -)  500,000 units PO Q6HPO [facility]


   Last Admin: 05/27/18 11:23 Dose:  500,000 units


Pantoprazole Sodium (Protonix -)  40 mg PO DAILY [facility]


   Last Admin: 05/27/18 09:24 Dose:  40 mg











- Allergies


Allergies: 


 Allergies











Allergy/AdvReac Type Severity Reaction Status Date / Time


 


No Known Allergies Allergy   Verified 05/25/18 14:34














- Current Living Status


Usual Living Arrangement: With Spouse





- Current Mental Status Evaluation


Appearance: Well Groomed


Attitude: Cooperative





- Affect


Affect: Constrictive


Appropriateness: Appropriate to Content





- Mood


Mood: Depressed





- Speech/Language


Expressive: Coherent





- Psychomotor Activity


Psychomotor Activity: Normal





- Thought Process


Thought Process: Intact





- Thought Content


Hallucinations: Absent


Delusions: Absent





- Self Perception


Self Perception: No Impairment





- Cognition


Attention: Alert


Orientation: Time


Memory, Immediate Recall: Intact


Memory, Short Term: 3/3


Memory, Remote with Prompting: 3/3





- Concentration


Serial Sevens Intact: Yes


Simple Calculations Intact: Yes





- Abstraction


Proverb Interpretation: Intact


Judgement: Moderately Impaired





- Insight


Insight: Intact





- Suicidal Ideation


Suicidal Ideation: No





- Homicidal Ideation


Homicidal Ideation: No





Assessment/Plan





1)Lexapro 10mg po od.

## 2018-05-27 NOTE — PN
Progress Note (short form)





- Note


Progress Note: 





PULMONARY





Breathing better today. Still with LUQ pain with coughing. Less wheezing.





 Last Vital Signs











Temp Pulse Resp BP Pulse Ox


 


 98.4 F   98 H  20   149/82   96 


 


 05/27/18 05:31  05/27/18 05:31  05/27/18 05:31  05/27/18 05:31  05/26/18 20:12








Gen:  less tachypneic


Heart: RRR


Lung: better air entry, less wheezes


Abd: soft, nontender


Ext: no edema





 CBC, BMP





 05/26/18 06:10 





 05/27/18 06:53 





Active Medications





Acetaminophen (Tylenol -)  650 mg PO Q6H PRN


   PRN Reason: PAIN


   Last Admin: 05/27/18 11:22 Dose:  650 mg


Albuterol Sulfate (Ventolin 0.083% Nebulizer Soln -)  1 amp NEB Q4H PRN


   PRN Reason: SHORT OF BREATH/WHEEZING


   Last Admin: 05/27/18 09:27 Dose:  1 amp


Budesonide/Formoterol Fumarate (Symbicort 80/4.5mcg -)  2 puff IH BID [facility]


   Last Admin: 05/27/18 09:24 Dose:  2 puff


Chlordiazepoxide HCl (Librium -)  25 mg PO Q6H PRN


   PRN Reason: WITHDRAWAL(CONT SUBST)


Chlordiazepoxide HCl (Librium -)  0 mg PO N3H-QPD [facility]; [facility]


   Stop: 06/08/18 10:59


   Last Admin: 05/27/18 11:23 Dose:  25 mg


Azithromycin 500 mg/ Dextrose  250 mls @ 250 mls/hr IVPB DAILY [facility]


   Stop: 05/28/18 11:44


   Last Admin: 05/26/18 15:51 Dose:  250 mls/hr


Insulin Aspart (Novolog Vial Sliding Scale -)  1 vial SQ Q4H [facility]; Protocol


   Last Admin: 05/27/18 09:39 Dose:  2 unit


Methylprednisolone Sodium Succinate (Solu-Medrol -)  40 mg IVPUSH Q8H-IV [facility]


Montelukast Sodium (Singulair -)  10 mg PO HS [facility]


   Last Admin: 05/26/18 21:49 Dose:  10 mg


Nicotine (Nicoderm Patch -)  21 mg TD DAILY [facility]


   Last Admin: 05/27/18 09:24 Dose:  21 mg


Nystatin (Nystatin Oral Suspension -)  500,000 units PO Q6HPO [facility]


   Last Admin: 05/27/18 11:23 Dose:  500,000 units


Pantoprazole Sodium (Protonix -)  40 mg PO DAILY [facility]


   Last Admin: 05/27/18 09:24 Dose:  40 mg





A/P


Acute COPD Exacerbation


Alcohol Abuse


Elevated LFTs likely above


Smoker





-  medrol


-  inhaled bronchodilators standing and PRN


-  can likely change steroids to PO prednisone in AM if continues to improve


-  O2 as needed


-  smoking cessation


-  DVT prophylaxis





Problem List





- Problems


(1) COPD with acute exacerbation


Code(s): J44.1 - CHRONIC OBSTRUCTIVE PULMONARY DISEASE W (ACUTE) EXACERBATION   





(2) Alcohol abuse


Code(s): F10.10 - ALCOHOL ABUSE, UNCOMPLICATED   





(3) Elevated LFTs


Code(s): R79.89 - OTHER SPECIFIED ABNORMAL FINDINGS OF BLOOD CHEMISTRY   





(4) Smoker


Code(s): F17.200 - NICOTINE DEPENDENCE, UNSPECIFIED, UNCOMPLICATED

## 2018-05-27 NOTE — PN
Progress Note, Physician


History of Present Illness: 





Pt still feels anxious. Pt c/o depression, episodes of crying.


Pt c/o rib pain associated with cough.


Pt w/o SOB while in bed, no CP/ palpitations/ dizziness/ abd pain.


Pt is wheezing less.





- Current Medication List


Current Medications: 


Active Medications





Acetaminophen (Tylenol -)  650 mg PO Q6H PRN


   PRN Reason: PAIN


   Last Admin: 05/27/18 05:33 Dose:  650 mg


Albuterol Sulfate (Ventolin 0.083% Nebulizer Soln -)  1 amp NEB Q4H PRN


   PRN Reason: SHORT OF BREATH/WHEEZING


   Last Admin: 05/27/18 09:27 Dose:  1 amp


Budesonide/Formoterol Fumarate (Symbicort 80/4.5mcg -)  2 puff IH BID [facility]


   Last Admin: 05/27/18 09:24 Dose:  2 puff


Chlordiazepoxide HCl (Librium -)  10 mg PO Q6H PRN


   PRN Reason: WITHDRAWAL(CONT SUBST)


   Last Admin: 05/27/18 09:24 Dose:  10 mg


Chlordiazepoxide HCl (Librium -)  20 mg PO Q6HPO [facility]


   Last Admin: 05/27/18 05:36 Dose:  20 mg


Azithromycin 500 mg/ Dextrose  250 mls @ 250 mls/hr IVPB DAILY [facility]


   Stop: 05/28/18 11:44


   Last Admin: 05/26/18 15:51 Dose:  250 mls/hr


Insulin Aspart (Novolog Vial Sliding Scale -)  1 vial SQ Q4H [facility]; Protocol


   Last Admin: 05/27/18 09:39 Dose:  2 unit


Methylprednisolone Sodium Succinate (Solu-Medrol -)  40 mg IVPUSH Q6H-IV [facility]


   Last Admin: 05/27/18 09:24 Dose:  40 mg


Montelukast Sodium (Singulair -)  10 mg PO HS [facility]


   Last Admin: 05/26/18 21:49 Dose:  10 mg


Nicotine (Nicoderm Patch -)  21 mg TD DAILY [facility]


   Last Admin: 05/27/18 09:24 Dose:  21 mg


Nystatin (Nystatin Oral Suspension -)  500,000 units PO Q6HPO [facility]


   Last Admin: 05/27/18 05:34 Dose:  500,000 units


Pantoprazole Sodium (Protonix -)  40 mg PO DAILY [facility]


   Last Admin: 05/27/18 09:24 Dose:  40 mg











- Objective


Vital Signs: 


 Vital Signs











Temperature  98.4 F   05/27/18 05:31


 


Pulse Rate  98 H  05/27/18 05:31


 


Respiratory Rate  20   05/27/18 05:31


 


Blood Pressure  149/82   05/27/18 05:31


 


O2 Sat by Pulse Oximetry (%)  96   05/26/18 20:12











Constitutional: Yes: No Distress, Calm


Cardiovascular: Yes: Regular Rate and Rhythm, S1, S2


Respiratory: Yes: Regular, CTA Bilaterally.  No: Other


Gastrointestinal: Yes: Normal Bowel Sounds, Soft, Abdomen, Obese.  No: 

Tenderness


Edema: No


Neurological: Yes: Alert, Oriented


Labs: 


 CBC, BMP





 05/26/18 06:10 





 05/27/18 06:53 











Problem List





- Problems


(1) Alcohol abuse


Code(s): F10.10 - ALCOHOL ABUSE, UNCOMPLICATED   





(2) Acute asthma exacerbation


Code(s): J45.901 - UNSPECIFIED ASTHMA WITH (ACUTE) EXACERBATION   


Qualifiers: 


   Asthma severity: unspecified severity   Asthma persistence: unspecified   

Qualified Code(s): J45.901 - Unspecified asthma with (acute) exacerbation   





(3) PNA (pneumonia)


Code(s): J18.9 - PNEUMONIA, UNSPECIFIED ORGANISM   


Qualifiers: 


   Pneumonia type: due to unspecified organism   Laterality: unspecified 

laterality   Lung location: unspecified part of lung   Qualified Code(s): J18.9 

- Pneumonia, unspecified organism   





(4) Tachycardia


Code(s): R00.0 - TACHYCARDIA, UNSPECIFIED   





(5) Thrush, oral


Code(s): B37.0 - CANDIDAL STOMATITIS   





(6) Hyponatremia


Code(s): E87.1 - HYPO-OSMOLALITY AND HYPONATREMIA   





(7) GERD (gastroesophageal reflux disease)


Code(s): K21.9 - GASTRO-ESOPHAGEAL REFLUX DISEASE WITHOUT ESOPHAGITIS   





(8) Elevated LFTs


Code(s): R79.89 - OTHER SPECIFIED ABNORMAL FINDINGS OF BLOOD CHEMISTRY   





(9) Elevated fasting glucose


Code(s): R73.01 - IMPAIRED FASTING GLUCOSE   





(10) Acute bronchitis


Code(s): J20.9 - ACUTE BRONCHITIS, UNSPECIFIED   





(11) COPD with acute exacerbation


Code(s): J44.1 - CHRONIC OBSTRUCTIVE PULMONARY DISEASE W (ACUTE) EXACERBATION   





Assessment/Plan





Admitted to monitor bed (pt with respiratory disease, requires Librium for 

alcohol withdrawing; to monitor for respiratory decompensation); to increase 

Librium dose.


Psychiatry consult.


IV solu-medrol, to belen


Rib XR


Duoned nebulized


Pulmonary consult appreciated


Nystatin oral solution


Nicotine patch


Azithromycin


AM labs


Case was d/w pt's nurse

## 2018-05-28 LAB
ANION GAP SERPL CALC-SCNC: 9 MMOL/L (ref 8–16)
BUN SERPL-MCNC: 22 MG/DL (ref 7–18)
CALCIUM SERPL-MCNC: 8.8 MG/DL (ref 8.5–10.1)
CHLORIDE SERPL-SCNC: 105 MMOL/L (ref 98–107)
CO2 SERPL-SCNC: 25 MMOL/L (ref 21–32)
CREAT SERPL-MCNC: 1 MG/DL (ref 0.7–1.3)
GLUCOSE SERPL-MCNC: 162 MG/DL (ref 74–106)
POTASSIUM SERPLBLD-SCNC: 4.1 MMOL/L (ref 3.5–5.1)
SODIUM SERPL-SCNC: 139 MMOL/L (ref 136–145)

## 2018-05-28 RX ADMIN — METHYLPREDNISOLONE SODIUM SUCCINATE SCH MG: 40 INJECTION, POWDER, FOR SOLUTION INTRAMUSCULAR; INTRAVENOUS at 01:00

## 2018-05-28 RX ADMIN — BUDESONIDE AND FORMOTEROL FUMARATE DIHYDRATE SCH PUFF: 80; 4.5 AEROSOL RESPIRATORY (INHALATION) at 09:40

## 2018-05-28 RX ADMIN — METHYLPREDNISOLONE SODIUM SUCCINATE SCH MG: 40 INJECTION, POWDER, FOR SOLUTION INTRAMUSCULAR; INTRAVENOUS at 17:25

## 2018-05-28 RX ADMIN — IPRATROPIUM BROMIDE AND ALBUTEROL SULFATE SCH AMP: .5; 3 SOLUTION RESPIRATORY (INHALATION) at 15:27

## 2018-05-28 RX ADMIN — BUDESONIDE AND FORMOTEROL FUMARATE DIHYDRATE SCH PUFF: 80; 4.5 AEROSOL RESPIRATORY (INHALATION) at 22:08

## 2018-05-28 RX ADMIN — ACETAMINOPHEN PRN MG: 325 TABLET ORAL at 00:41

## 2018-05-28 RX ADMIN — MONTELUKAST SODIUM SCH MG: 10 TABLET, COATED ORAL at 22:08

## 2018-05-28 RX ADMIN — INSULIN ASPART SCH: 100 INJECTION, SOLUTION INTRAVENOUS; SUBCUTANEOUS at 06:43

## 2018-05-28 RX ADMIN — ACETAMINOPHEN PRN MG: 325 TABLET ORAL at 18:18

## 2018-05-28 RX ADMIN — NICOTINE SCH MG: 21 PATCH TRANSDERMAL at 09:39

## 2018-05-28 RX ADMIN — PANTOPRAZOLE SODIUM SCH MG: 40 TABLET, DELAYED RELEASE ORAL at 09:39

## 2018-05-28 RX ADMIN — IPRATROPIUM BROMIDE AND ALBUTEROL SULFATE SCH AMP: .5; 3 SOLUTION RESPIRATORY (INHALATION) at 11:24

## 2018-05-28 RX ADMIN — INSULIN ASPART SCH: 100 INJECTION, SOLUTION INTRAVENOUS; SUBCUTANEOUS at 17:32

## 2018-05-28 RX ADMIN — NYSTATIN SCH UNITS: 100000 SUSPENSION ORAL at 05:21

## 2018-05-28 RX ADMIN — NYSTATIN SCH UNITS: 100000 SUSPENSION ORAL at 00:01

## 2018-05-28 RX ADMIN — IPRATROPIUM BROMIDE AND ALBUTEROL SULFATE SCH AMP: .5; 3 SOLUTION RESPIRATORY (INHALATION) at 20:25

## 2018-05-28 RX ADMIN — ACETAMINOPHEN PRN MG: 325 TABLET ORAL at 09:47

## 2018-05-28 RX ADMIN — ESCITALOPRAM OXALATE SCH MG: 10 TABLET, FILM COATED ORAL at 09:39

## 2018-05-28 RX ADMIN — METHYLPREDNISOLONE SODIUM SUCCINATE SCH MG: 40 INJECTION, POWDER, FOR SOLUTION INTRAMUSCULAR; INTRAVENOUS at 09:39

## 2018-05-28 RX ADMIN — NYSTATIN SCH UNITS: 100000 SUSPENSION ORAL at 11:26

## 2018-05-28 RX ADMIN — NYSTATIN SCH UNITS: 100000 SUSPENSION ORAL at 17:25

## 2018-05-28 RX ADMIN — AZITHROMYCIN DIHYDRATE SCH MLS/HR: 500 INJECTION, POWDER, LYOPHILIZED, FOR SOLUTION INTRAVENOUS at 10:00

## 2018-05-28 RX ADMIN — INSULIN ASPART SCH: 100 INJECTION, SOLUTION INTRAVENOUS; SUBCUTANEOUS at 11:28

## 2018-05-28 NOTE — PN
Progress Note, Physician


History of Present Illness: 





Pt still feels anxious, somewhat better.


Pt c/o rib pain associated with cough.


Pt w/o SOB while in bed, no CP/ palpitations/ dizziness/ abd pain.


Pt is wheezing less.





- Current Medication List


Current Medications: 


Active Medications





Acetaminophen (Tylenol -)  650 mg PO Q6H PRN


   PRN Reason: PAIN


   Last Admin: 05/28/18 09:47 Dose:  650 mg


Albuterol Sulfate (Ventolin 0.083% Nebulizer Soln -)  1 amp NEB Q4H PRN


   PRN Reason: SHORT OF BREATH/WHEEZING


   Last Admin: 05/27/18 17:20 Dose:  1 amp


Albuterol/Ipratropium (Duoneb -)  1 amp NEB RQID [facility]


   Last Admin: 05/28/18 11:24 Dose:  1 amp


Budesonide/Formoterol Fumarate (Symbicort 80/4.5mcg -)  2 puff IH BID [facility]


   Last Admin: 05/28/18 09:40 Dose:  2 puff


Chlordiazepoxide HCl (Librium -)  25 mg PO Q6H PRN


   PRN Reason: WITHDRAWAL(CONT SUBST)


   Last Admin: 05/27/18 21:32 Dose:  25 mg


Chlordiazepoxide HCl (Librium -)  25 mg PO F2V-NAJ [facility]


   Stop: 05/29/18 10:59


   Last Admin: 05/28/18 11:23 Dose:  25 mg


Chlordiazepoxide HCl (Librium -)  20 mg PO C9U-IAP [facility]


   Stop: 05/31/18 10:59


Chlordiazepoxide HCl (Librium -)  10 mg PO Q6H-MBK [facility]


   Stop: 06/02/18 10:59


Chlordiazepoxide HCl (Librium -)  10 mg PO 0300,1100,1900 [facility]


   Stop: 06/04/18 10:59


Chlordiazepoxide HCl (Librium -)  10 mg PO Q12H [facility]


   Stop: 06/06/18 10:59


Chlordiazepoxide HCl (Librium -)  10 mg PO Q24H [facility]


   Stop: 06/08/18 10:59


Escitalopram Oxalate (Lexapro -)  10 mg PO DAILY [facility]


   Last Admin: 05/28/18 09:39 Dose:  10 mg


Insulin Aspart (Novolog Vial Sliding Scale -)  1 vial SQ TIDAC [facility]; Protocol


   Last Admin: 05/28/18 11:28 Dose:  Not Given


Methylprednisolone Sodium Succinate (Solu-Medrol -)  40 mg IVPUSH Q8H-IV [facility]


   Last Admin: 05/28/18 09:39 Dose:  40 mg


Montelukast Sodium (Singulair -)  10 mg PO HS [facility]


   Last Admin: 05/27/18 21:31 Dose:  10 mg


Nicotine (Nicoderm Patch -)  21 mg TD DAILY [facility]


   Last Admin: 05/28/18 09:39 Dose:  21 mg


Nystatin (Nystatin Oral Suspension -)  500,000 units PO Q6HPO [facility]


   Last Admin: 05/28/18 11:26 Dose:  500,000 units


Pantoprazole Sodium (Protonix -)  40 mg PO DAILY [facility]


   Last Admin: 05/28/18 09:39 Dose:  40 mg











- Objective


Vital Signs: 


 Vital Signs











Temperature  98 F   05/28/18 09:00


 


Pulse Rate  90   05/28/18 09:00


 


Respiratory Rate  20   05/28/18 09:00


 


Blood Pressure  132/92   05/28/18 09:00


 


O2 Sat by Pulse Oximetry (%)  97   05/28/18 09:00











Constitutional: Yes: No Distress, Calm


Cardiovascular: Yes: Regular Rate and Rhythm, S1, S2


Respiratory: Yes: Regular, Rales, Wheezes


Gastrointestinal: Yes: Normal Bowel Sounds, Soft.  No: Tenderness


Edema: No


Neurological: Yes: Alert, Oriented


Labs: 


 CBC, BMP





 05/26/18 06:10 





 05/28/18 06:50 











- ....Imaging


X-ray: Report Reviewed





Problem List





- Problems


(1) Alcohol abuse


Code(s): F10.10 - ALCOHOL ABUSE, UNCOMPLICATED   





(2) Acute asthma exacerbation


Code(s): J45.901 - UNSPECIFIED ASTHMA WITH (ACUTE) EXACERBATION   


Qualifiers: 


   Asthma severity: unspecified severity   Asthma persistence: unspecified   

Qualified Code(s): J45.901 - Unspecified asthma with (acute) exacerbation   





(3) PNA (pneumonia)


Code(s): J18.9 - PNEUMONIA, UNSPECIFIED ORGANISM   


Qualifiers: 


   Pneumonia type: due to unspecified organism   Laterality: unspecified 

laterality   Lung location: unspecified part of lung   Qualified Code(s): J18.9 

- Pneumonia, unspecified organism   





(4) Tachycardia


Code(s): R00.0 - TACHYCARDIA, UNSPECIFIED   





(5) Thrush, oral


Code(s): B37.0 - CANDIDAL STOMATITIS   





(6) Hyponatremia


Code(s): E87.1 - HYPO-OSMOLALITY AND HYPONATREMIA   





(7) GERD (gastroesophageal reflux disease)


Code(s): K21.9 - GASTRO-ESOPHAGEAL REFLUX DISEASE WITHOUT ESOPHAGITIS   





(8) Elevated LFTs


Code(s): R79.89 - OTHER SPECIFIED ABNORMAL FINDINGS OF BLOOD CHEMISTRY   





(9) Elevated fasting glucose


Code(s): R73.01 - IMPAIRED FASTING GLUCOSE   





(10) Acute bronchitis


Code(s): J20.9 - ACUTE BRONCHITIS, UNSPECIFIED   





(11) COPD with acute exacerbation


Code(s): J44.1 - CHRONIC OBSTRUCTIVE PULMONARY DISEASE W (ACUTE) EXACERBATION   





Assessment/Plan





Admitted to monitor bed (pt with respiratory disease, requires Librium for 

alcohol withdrawing; to monitor for respiratory decompensation).


Psychiatry consult.


IV solu-medrol, continue same dose


Rib XR- reviewed, no Fx


Duoned nebulized


Pulmonary consult appreciated


Nystatin oral solution


Nicotine patch


Azithromycin


AM labs


Case was d/w pt's nurse

## 2018-05-28 NOTE — PN
Progress Note (short form)





- Note


Progress Note: 





Case discussed  with  about Management with Lexapro.


Patient seen, alert, walking around, In Good spirits. No evidence of any 

suicidal or other behavioral disturbances. Cognition is intact. 


Tolerating Lexapro well.


Plan; Discharge when medically clear.


2) continue with LExapro 10mg po od.

## 2018-05-28 NOTE — PN
Progress Note (short form)





- Note


Progress Note: 





PULMONARY





Breathing slowly improving. Still with LUQ pain with coughing. Less wheezing.





 Last Vital Signs











Temp Pulse Resp BP Pulse Ox


 


 97.6 F   80   20   137/75   96 


 


 05/28/18 06:00  05/28/18 06:00  05/28/18 06:00  05/28/18 06:00  05/27/18 21:00











Gen:  less tachypneic


Heart: RRR


Lung: better air entry, less wheezes


Abd: soft, nontender


Ext: no edema


 CBC, BMP





 05/26/18 06:10 





 05/28/18 06:50 





Active Medications





Acetaminophen (Tylenol -)  650 mg PO Q6H PRN


   PRN Reason: PAIN


   Last Admin: 05/28/18 09:47 Dose:  650 mg


Albuterol Sulfate (Ventolin 0.083% Nebulizer Soln -)  1 amp NEB Q4H PRN


   PRN Reason: SHORT OF BREATH/WHEEZING


   Last Admin: 05/27/18 17:20 Dose:  1 amp


Budesonide/Formoterol Fumarate (Symbicort 80/4.5mcg -)  2 puff IH BID [facility]


   Last Admin: 05/28/18 09:40 Dose:  2 puff


Chlordiazepoxide HCl (Librium -)  25 mg PO Q6H PRN


   PRN Reason: WITHDRAWAL(CONT SUBST)


   Last Admin: 05/27/18 21:32 Dose:  25 mg


Chlordiazepoxide HCl (Librium -)  25 mg PO Q4P-JCN [facility]


   Stop: 05/29/18 10:59


   Last Admin: 05/28/18 05:21 Dose:  25 mg


Chlordiazepoxide HCl (Librium -)  20 mg PO [id number] [facility]


   Stop: 05/31/18 10:59


Chlordiazepoxide HCl (Librium -)  10 mg PO D3V-MWI [facility]


   Stop: 06/02/18 10:59


Chlordiazepoxide HCl (Librium -)  10 mg PO 0300,1100,1900 [facility]


   Stop: 06/04/18 10:59


Chlordiazepoxide HCl (Librium -)  10 mg PO Q12H [facility]


   Stop: 06/06/18 10:59


Chlordiazepoxide HCl (Librium -)  10 mg PO Q24H [facility]


   Stop: 06/08/18 10:59


Escitalopram Oxalate (Lexapro -)  10 mg PO DAILY [facility]


   Last Admin: 05/28/18 09:39 Dose:  10 mg


Azithromycin 500 mg/ Dextrose  250 mls @ 250 mls/hr IVPB DAILY [facility]


   Stop: 05/28/18 11:44


   Last Admin: 05/27/18 13:00 Dose:  250 mls/hr


Insulin Aspart (Novolog Vial Sliding Scale -)  1 vial SQ TIDAC [facility]; Protocol


   Last Admin: 05/28/18 06:43 Dose:  Not Given


Methylprednisolone Sodium Succinate (Solu-Medrol -)  40 mg IVPUSH Q8H-IV [facility]


   Last Admin: 05/28/18 09:39 Dose:  40 mg


Montelukast Sodium (Singulair -)  10 mg PO HS [facility]


   Last Admin: 05/27/18 21:31 Dose:  10 mg


Nicotine (Nicoderm Patch -)  21 mg TD DAILY [facility]


   Last Admin: 05/28/18 09:39 Dose:  21 mg


Nystatin (Nystatin Oral Suspension -)  500,000 units PO Q6HPO [facility]


   Last Admin: 05/28/18 05:21 Dose:  500,000 units


Pantoprazole Sodium (Protonix -)  40 mg PO DAILY [facility]


   Last Admin: 05/28/18 09:39 Dose:  40 mg








A/P


Acute COPD Exacerbation


Alcohol Abuse


Elevated LFTs likely above


Smoker





-  continue medrol at current dose


-  inhaled bronchodilators standing and PRN


-  O2 as needed


-  smoking cessation


-  DVT prophylaxis





Problem List





- Problems


(1) COPD with acute exacerbation


Code(s): J44.1 - CHRONIC OBSTRUCTIVE PULMONARY DISEASE W (ACUTE) EXACERBATION   





(2) Alcohol abuse


Code(s): F10.10 - ALCOHOL ABUSE, UNCOMPLICATED   





(3) Elevated LFTs


Code(s): R79.89 - OTHER SPECIFIED ABNORMAL FINDINGS OF BLOOD CHEMISTRY   





(4) Smoker


Code(s): F17.200 - NICOTINE DEPENDENCE, UNSPECIFIED, UNCOMPLICATED

## 2018-05-29 LAB
ANION GAP SERPL CALC-SCNC: 8 MMOL/L (ref 8–16)
BUN SERPL-MCNC: 24 MG/DL (ref 7–18)
CALCIUM SERPL-MCNC: 9 MG/DL (ref 8.5–10.1)
CHLORIDE SERPL-SCNC: 101 MMOL/L (ref 98–107)
CO2 SERPL-SCNC: 28 MMOL/L (ref 21–32)
CREAT SERPL-MCNC: 1.1 MG/DL (ref 0.7–1.3)
GLUCOSE SERPL-MCNC: 163 MG/DL (ref 74–106)
POTASSIUM SERPLBLD-SCNC: 4 MMOL/L (ref 3.5–5.1)
SODIUM SERPL-SCNC: 137 MMOL/L (ref 136–145)

## 2018-05-29 RX ADMIN — NYSTATIN SCH UNITS: 100000 SUSPENSION ORAL at 17:18

## 2018-05-29 RX ADMIN — BUDESONIDE AND FORMOTEROL FUMARATE DIHYDRATE SCH PUFF: 80; 4.5 AEROSOL RESPIRATORY (INHALATION) at 21:20

## 2018-05-29 RX ADMIN — PANTOPRAZOLE SODIUM SCH MG: 40 TABLET, DELAYED RELEASE ORAL at 09:45

## 2018-05-29 RX ADMIN — INSULIN ASPART SCH: 100 INJECTION, SOLUTION INTRAVENOUS; SUBCUTANEOUS at 06:48

## 2018-05-29 RX ADMIN — IPRATROPIUM BROMIDE AND ALBUTEROL SULFATE SCH AMP: .5; 3 SOLUTION RESPIRATORY (INHALATION) at 15:22

## 2018-05-29 RX ADMIN — MONTELUKAST SODIUM SCH MG: 10 TABLET, COATED ORAL at 21:20

## 2018-05-29 RX ADMIN — INSULIN ASPART SCH: 100 INJECTION, SOLUTION INTRAVENOUS; SUBCUTANEOUS at 17:17

## 2018-05-29 RX ADMIN — IPRATROPIUM BROMIDE AND ALBUTEROL SULFATE SCH: .5; 3 SOLUTION RESPIRATORY (INHALATION) at 07:20

## 2018-05-29 RX ADMIN — ACETAMINOPHEN PRN MG: 325 TABLET ORAL at 09:58

## 2018-05-29 RX ADMIN — ALBUTEROL SULFATE PRN AMP: 2.5 SOLUTION RESPIRATORY (INHALATION) at 06:55

## 2018-05-29 RX ADMIN — METHYLPREDNISOLONE SODIUM SUCCINATE SCH MG: 40 INJECTION, POWDER, FOR SOLUTION INTRAMUSCULAR; INTRAVENOUS at 01:24

## 2018-05-29 RX ADMIN — BUDESONIDE AND FORMOTEROL FUMARATE DIHYDRATE SCH PUFF: 80; 4.5 AEROSOL RESPIRATORY (INHALATION) at 09:45

## 2018-05-29 RX ADMIN — ACETAMINOPHEN PRN MG: 325 TABLET ORAL at 01:24

## 2018-05-29 RX ADMIN — METHYLPREDNISOLONE SODIUM SUCCINATE SCH MG: 40 INJECTION, POWDER, FOR SOLUTION INTRAMUSCULAR; INTRAVENOUS at 17:19

## 2018-05-29 RX ADMIN — INSULIN ASPART SCH: 100 INJECTION, SOLUTION INTRAVENOUS; SUBCUTANEOUS at 11:27

## 2018-05-29 RX ADMIN — NYSTATIN SCH UNITS: 100000 SUSPENSION ORAL at 11:23

## 2018-05-29 RX ADMIN — IPRATROPIUM BROMIDE AND ALBUTEROL SULFATE SCH AMP: .5; 3 SOLUTION RESPIRATORY (INHALATION) at 11:47

## 2018-05-29 RX ADMIN — NYSTATIN SCH UNITS: 100000 SUSPENSION ORAL at 00:30

## 2018-05-29 RX ADMIN — METHYLPREDNISOLONE SODIUM SUCCINATE SCH MG: 40 INJECTION, POWDER, FOR SOLUTION INTRAMUSCULAR; INTRAVENOUS at 09:45

## 2018-05-29 RX ADMIN — ESCITALOPRAM OXALATE SCH MG: 10 TABLET, FILM COATED ORAL at 09:45

## 2018-05-29 RX ADMIN — NICOTINE SCH MG: 21 PATCH TRANSDERMAL at 09:45

## 2018-05-29 RX ADMIN — ACETAMINOPHEN PRN MG: 325 TABLET ORAL at 20:11

## 2018-05-29 RX ADMIN — NYSTATIN SCH UNITS: 100000 SUSPENSION ORAL at 05:48

## 2018-05-29 RX ADMIN — IPRATROPIUM BROMIDE AND ALBUTEROL SULFATE SCH AMP: .5; 3 SOLUTION RESPIRATORY (INHALATION) at 21:00

## 2018-05-29 NOTE — PN
Progress Note, Physician


History of Present Illness: 





Pt still feels anxious, somewhat better.


Pt c/o rib pain associated with cough.


Pt w/o SOB while in bed, no CP/ palpitations/ dizziness/ abd pain.


Pt is wheezing less.





- Current Medication List


Current Medications: 


Active Medications





Acetaminophen (Tylenol -)  650 mg PO Q6H PRN


   PRN Reason: PAIN


   Last Admin: 05/29/18 09:58 Dose:  650 mg


Albuterol Sulfate (Ventolin 0.083% Nebulizer Soln -)  1 amp NEB Q4H PRN


   PRN Reason: SHORT OF BREATH/WHEEZING


   Last Admin: 05/29/18 06:55 Dose:  1 amp


Albuterol/Ipratropium (Duoneb -)  1 amp NEB RQID [facility]


   Last Admin: 05/29/18 11:47 Dose:  1 amp


Budesonide/Formoterol Fumarate (Symbicort 80/4.5mcg -)  2 puff IH BID [facility]


   Last Admin: 05/29/18 09:45 Dose:  2 puff


Chlordiazepoxide HCl (Librium -)  25 mg PO Q6H PRN


   PRN Reason: WITHDRAWAL(CONT SUBST)


   Last Admin: 05/29/18 01:23 Dose:  25 mg


Chlordiazepoxide HCl (Librium -)  20 mg PO [id number] [facility]


   Stop: 05/31/18 10:59


   Last Admin: 05/29/18 11:23 Dose:  20 mg


Chlordiazepoxide HCl (Librium -)  10 mg PO [id number] [facility]


   Stop: 06/02/18 10:59


Chlordiazepoxide HCl (Librium -)  10 mg PO 0300,1100,1900 [facility]


   Stop: 06/04/18 10:59


Chlordiazepoxide HCl (Librium -)  10 mg PO Q12H [facility]


   Stop: 06/06/18 10:59


Chlordiazepoxide HCl (Librium -)  10 mg PO Q24H [facility]


   Stop: 06/08/18 10:59


Escitalopram Oxalate (Lexapro -)  10 mg PO DAILY [facility]


   Last Admin: 05/29/18 09:45 Dose:  10 mg


Insulin Aspart (Novolog Vial Sliding Scale -)  1 vial SQ TIDAC [facility]; Protocol


   Last Admin: 05/29/18 11:27 Dose:  Not Given


Methylprednisolone Sodium Succinate (Solu-Medrol -)  40 mg IVPUSH Q8H-IV [facility]


   Stop: 05/30/18 02:00


   Last Admin: 05/29/18 09:45 Dose:  40 mg


Methylprednisolone Sodium Succinate (Solu-Medrol -)  40 mg IVPUSH Q12H [facility]


Montelukast Sodium (Singulair -)  10 mg PO HS [facility]


   Last Admin: 05/28/18 22:08 Dose:  10 mg


Nicotine (Nicoderm Patch -)  21 mg TD DAILY [facility]


   Last Admin: 05/29/18 09:45 Dose:  21 mg


Nystatin (Nystatin Oral Suspension -)  500,000 units PO Q6HPO [facility]


   Last Admin: 05/29/18 11:23 Dose:  500,000 units


Pantoprazole Sodium (Protonix -)  40 mg PO DAILY [facility]


   Last Admin: 05/29/18 09:45 Dose:  40 mg











- Objective


Vital Signs: 


 Vital Signs











Temperature  98.3 F   05/29/18 15:00


 


Pulse Rate  86   05/29/18 15:00


 


Respiratory Rate  20   05/29/18 15:00


 


Blood Pressure  120/79   05/29/18 15:00


 


O2 Sat by Pulse Oximetry (%)  98   05/28/18 21:00











Constitutional: Yes: No Distress, Calm


Cardiovascular: Yes: Regular Rate and Rhythm, S1, S2


Respiratory: Yes: Regular, CTA Bilaterally, Wheezes


Gastrointestinal: Yes: Normal Bowel Sounds, Soft.  No: Tenderness


Edema: No


Neurological: Yes: Alert, Oriented


Labs: 


 CBC, BMP





 05/26/18 06:10 





 05/29/18 06:00 











Problem List





- Problems


(1) Alcohol abuse


Code(s): F10.10 - ALCOHOL ABUSE, UNCOMPLICATED   





(2) Acute asthma exacerbation


Code(s): J45.901 - UNSPECIFIED ASTHMA WITH (ACUTE) EXACERBATION   


Qualifiers: 


   Asthma severity: unspecified severity   Asthma persistence: unspecified   

Qualified Code(s): J45.901 - Unspecified asthma with (acute) exacerbation   





(3) PNA (pneumonia)


Code(s): J18.9 - PNEUMONIA, UNSPECIFIED ORGANISM   


Qualifiers: 


   Pneumonia type: due to unspecified organism   Laterality: unspecified 

laterality   Lung location: unspecified part of lung   Qualified Code(s): J18.9 

- Pneumonia, unspecified organism   





(4) Tachycardia


Code(s): R00.0 - TACHYCARDIA, UNSPECIFIED   





(5) Thrush, oral


Code(s): B37.0 - CANDIDAL STOMATITIS   





(6) Hyponatremia


Code(s): E87.1 - HYPO-OSMOLALITY AND HYPONATREMIA   





(7) GERD (gastroesophageal reflux disease)


Code(s): K21.9 - GASTRO-ESOPHAGEAL REFLUX DISEASE WITHOUT ESOPHAGITIS   





(8) Elevated LFTs


Code(s): R79.89 - OTHER SPECIFIED ABNORMAL FINDINGS OF BLOOD CHEMISTRY   





(9) Elevated fasting glucose


Code(s): R73.01 - IMPAIRED FASTING GLUCOSE   





(10) Acute bronchitis


Code(s): J20.9 - ACUTE BRONCHITIS, UNSPECIFIED   





(11) COPD with acute exacerbation


Code(s): J44.1 - CHRONIC OBSTRUCTIVE PULMONARY DISEASE W (ACUTE) EXACERBATION   





Assessment/Plan





Admitted to monitor bed (pt with respiratory disease, requires Librium for 

alcohol withdrawing; to monitor for respiratory decompensation).


Psychiatry consult.


IV solu-medrol- belen per Pulmonary


Rib XR- reviewed, no Fx


Duoned nebulized


Pulmonary consult appreciated


Nystatin oral solution


Nicotine patch


Case was d/w pt's nurse

## 2018-05-29 NOTE — PN
Progress Note, Physician


History of Present Illness: 





pulmonary





alert,less dyspneic,+ dry cough,+ c/o LUQ pain with cough





- Current Medication List


Current Medications: 


Active Medications





Acetaminophen (Tylenol -)  650 mg PO Q6H PRN


   PRN Reason: PAIN


   Last Admin: 05/29/18 09:58 Dose:  650 mg


Albuterol Sulfate (Ventolin 0.083% Nebulizer Soln -)  1 amp NEB Q4H PRN


   PRN Reason: SHORT OF BREATH/WHEEZING


   Last Admin: 05/29/18 06:55 Dose:  1 amp


Albuterol/Ipratropium (Duoneb -)  1 amp NEB RQID [facility]


   Last Admin: 05/28/18 20:25 Dose:  1 amp


Budesonide/Formoterol Fumarate (Symbicort 80/4.5mcg -)  2 puff IH BID [facility]


   Last Admin: 05/29/18 09:45 Dose:  2 puff


Chlordiazepoxide HCl (Librium -)  25 mg PO Q6H PRN


   PRN Reason: WITHDRAWAL(CONT SUBST)


   Last Admin: 05/29/18 01:23 Dose:  25 mg


Chlordiazepoxide HCl (Librium -)  25 mg PO [id number] [facility]


   Stop: 05/29/18 10:59


   Last Admin: 05/29/18 05:48 Dose:  25 mg


Chlordiazepoxide HCl (Librium -)  20 mg PO [id number] [facility]


   Stop: 05/31/18 10:59


Chlordiazepoxide HCl (Librium -)  10 mg PO [id number] [facility]


   Stop: 06/02/18 10:59


Chlordiazepoxide HCl (Librium -)  10 mg PO 0300,1100,1900 [facility]


   Stop: 06/04/18 10:59


Chlordiazepoxide HCl (Librium -)  10 mg PO Q12H [facility]


   Stop: 06/06/18 10:59


Chlordiazepoxide HCl (Librium -)  10 mg PO Q24H [facility]


   Stop: 06/08/18 10:59


Escitalopram Oxalate (Lexapro -)  10 mg PO DAILY [facility]


   Last Admin: 05/29/18 09:45 Dose:  10 mg


Insulin Aspart (Novolog Vial Sliding Scale -)  1 vial SQ TIDAC [facility]; Protocol


   Last Admin: 05/29/18 06:48 Dose:  Not Given


Methylprednisolone Sodium Succinate (Solu-Medrol -)  40 mg IVPUSH Q8H-IV [facility]


   Last Admin: 05/29/18 09:45 Dose:  40 mg


Montelukast Sodium (Singulair -)  10 mg PO HS [facility]


   Last Admin: 05/28/18 22:08 Dose:  10 mg


Nicotine (Nicoderm Patch -)  21 mg TD DAILY [facility]


   Last Admin: 05/29/18 09:45 Dose:  21 mg


Nystatin (Nystatin Oral Suspension -)  500,000 units PO Q6HPO [facility]


   Last Admin: 05/29/18 05:48 Dose:  500,000 units


Pantoprazole Sodium (Protonix -)  40 mg PO DAILY [facility]


   Last Admin: 05/29/18 09:45 Dose:  40 mg











- Objective


Vital Signs: 


 Vital Signs











Temperature  97.8 F   05/29/18 06:00


 


Pulse Rate  65   05/29/18 06:00


 


Respiratory Rate  20   05/29/18 06:00


 


Blood Pressure  132/80   05/29/18 06:00


 


O2 Sat by Pulse Oximetry (%)  98   05/28/18 21:00











Constitutional: Yes: Well Nourished, Calm


Eyes: Yes: WNL


HENT: Yes: WNL


Neck: Yes: WNL


Cardiovascular: Yes: Regular Rate and Rhythm, S1, S2


Respiratory: Yes: Wheezes (few wheezes)


Gastrointestinal: Yes: Normal Bowel Sounds, Soft


Extremities: Yes: WNL


Edema: No


Labs: 


 CBC, BMP





 05/26/18 06:10 





 05/29/18 06:00 











Assessment/Plan





Assessment/Plan





Acute COPD Exacerbation


Alcohol Abuse


Elevated LFTs improving


Smoker





-  taper medrol


-  inhaled bronchodilators standing and PRN


-  O2 as needed


-  trend LFTs


-  smoking cessation


-  DVT prophylaxis


- chest ct








DR LYNCH

## 2018-05-30 RX ADMIN — METHYLPREDNISOLONE SODIUM SUCCINATE SCH MG: 40 INJECTION, POWDER, FOR SOLUTION INTRAMUSCULAR; INTRAVENOUS at 10:23

## 2018-05-30 RX ADMIN — IPRATROPIUM BROMIDE AND ALBUTEROL SULFATE SCH: .5; 3 SOLUTION RESPIRATORY (INHALATION) at 16:13

## 2018-05-30 RX ADMIN — IPRATROPIUM BROMIDE AND ALBUTEROL SULFATE SCH AMP: .5; 3 SOLUTION RESPIRATORY (INHALATION) at 11:15

## 2018-05-30 RX ADMIN — IPRATROPIUM BROMIDE AND ALBUTEROL SULFATE SCH AMP: .5; 3 SOLUTION RESPIRATORY (INHALATION) at 16:45

## 2018-05-30 RX ADMIN — NYSTATIN SCH UNITS: 100000 SUSPENSION ORAL at 17:27

## 2018-05-30 RX ADMIN — ACETAMINOPHEN PRN MG: 325 TABLET ORAL at 03:56

## 2018-05-30 RX ADMIN — METHYLPREDNISOLONE SODIUM SUCCINATE SCH MG: 40 INJECTION, POWDER, FOR SOLUTION INTRAMUSCULAR; INTRAVENOUS at 23:10

## 2018-05-30 RX ADMIN — IPRATROPIUM BROMIDE AND ALBUTEROL SULFATE SCH AMP: .5; 3 SOLUTION RESPIRATORY (INHALATION) at 07:45

## 2018-05-30 RX ADMIN — IBUPROFEN PRN MG: 400 TABLET, FILM COATED ORAL at 10:24

## 2018-05-30 RX ADMIN — INSULIN ASPART SCH: 100 INJECTION, SOLUTION INTRAVENOUS; SUBCUTANEOUS at 17:26

## 2018-05-30 RX ADMIN — ALBUTEROL SULFATE PRN AMP: 2.5 SOLUTION RESPIRATORY (INHALATION) at 04:06

## 2018-05-30 RX ADMIN — MONTELUKAST SODIUM SCH MG: 10 TABLET, COATED ORAL at 23:10

## 2018-05-30 RX ADMIN — NYSTATIN SCH UNITS: 100000 SUSPENSION ORAL at 12:04

## 2018-05-30 RX ADMIN — BUDESONIDE AND FORMOTEROL FUMARATE DIHYDRATE SCH PUFF: 80; 4.5 AEROSOL RESPIRATORY (INHALATION) at 10:23

## 2018-05-30 RX ADMIN — NYSTATIN SCH UNITS: 100000 SUSPENSION ORAL at 23:15

## 2018-05-30 RX ADMIN — NYSTATIN SCH UNITS: 100000 SUSPENSION ORAL at 05:49

## 2018-05-30 RX ADMIN — BUDESONIDE AND FORMOTEROL FUMARATE DIHYDRATE SCH PUFF: 80; 4.5 AEROSOL RESPIRATORY (INHALATION) at 23:11

## 2018-05-30 RX ADMIN — ESCITALOPRAM OXALATE SCH MG: 10 TABLET, FILM COATED ORAL at 10:25

## 2018-05-30 RX ADMIN — INSULIN ASPART SCH: 100 INJECTION, SOLUTION INTRAVENOUS; SUBCUTANEOUS at 12:03

## 2018-05-30 RX ADMIN — NYSTATIN SCH: 100000 SUSPENSION ORAL at 02:22

## 2018-05-30 RX ADMIN — IPRATROPIUM BROMIDE AND ALBUTEROL SULFATE SCH AMP: .5; 3 SOLUTION RESPIRATORY (INHALATION) at 20:00

## 2018-05-30 RX ADMIN — PANTOPRAZOLE SODIUM SCH MG: 40 TABLET, DELAYED RELEASE ORAL at 10:24

## 2018-05-30 RX ADMIN — INSULIN ASPART SCH: 100 INJECTION, SOLUTION INTRAVENOUS; SUBCUTANEOUS at 06:01

## 2018-05-30 RX ADMIN — ACETAMINOPHEN PRN MG: 325 TABLET ORAL at 18:52

## 2018-05-30 RX ADMIN — NICOTINE SCH MG: 21 PATCH TRANSDERMAL at 10:25

## 2018-05-30 RX ADMIN — IBUPROFEN PRN MG: 400 TABLET, FILM COATED ORAL at 23:16

## 2018-05-30 RX ADMIN — METHYLPREDNISOLONE SODIUM SUCCINATE SCH MG: 40 INJECTION, POWDER, FOR SOLUTION INTRAMUSCULAR; INTRAVENOUS at 02:14

## 2018-05-30 NOTE — PN
Progress Note, Physician


History of Present Illness: 








Pt w/o SOB while in bed, no CP/ palpitations/ dizziness/ abd pain.


Pt with improved wheezing.





- Current Medication List


Current Medications: 


Active Medications





Acetaminophen (Tylenol -)  650 mg PO Q6H PRN


   PRN Reason: PAIN


   Last Admin: 05/30/18 03:56 Dose:  650 mg


Albuterol Sulfate (Ventolin 0.083% Nebulizer Soln -)  1 amp NEB Q4H PRN


   PRN Reason: SHORT OF BREATH/WHEEZING


   Last Admin: 05/30/18 04:06 Dose:  1 amp


Albuterol/Ipratropium (Duoneb -)  1 amp NEB RQID [facility]


   Last Admin: 05/30/18 07:45 Dose:  1 amp


Budesonide/Formoterol Fumarate (Symbicort 80/4.5mcg -)  2 puff IH BID [facility]


   Last Admin: 05/30/18 10:23 Dose:  2 puff


Chlordiazepoxide HCl (Librium -)  25 mg PO Q6H PRN


   PRN Reason: WITHDRAWAL(CONT SUBST)


   Last Admin: 05/29/18 21:21 Dose:  25 mg


Chlordiazepoxide HCl (Librium -)  20 mg PO [id number] [facility]


   Stop: 05/31/18 10:59


   Last Admin: 05/30/18 11:02 Dose:  20 mg


Chlordiazepoxide HCl (Librium -)  10 mg PO A2L-POA [facility]


   Stop: 06/02/18 10:59


Chlordiazepoxide HCl (Librium -)  10 mg PO 0300,1100,1900 [facility]


   Stop: 06/04/18 10:59


Chlordiazepoxide HCl (Librium -)  10 mg PO Q12H [facility]


   Stop: 06/06/18 10:59


Chlordiazepoxide HCl (Librium -)  10 mg PO Q24H [facility]


   Stop: 06/08/18 10:59


Escitalopram Oxalate (Lexapro -)  10 mg PO DAILY [facility]


   Last Admin: 05/30/18 10:25 Dose:  10 mg


Ibuprofen (Motrin -)  400 mg PO Q6H PRN


   PRN Reason: PAIN SCALE 1-6


   Last Admin: 05/30/18 10:24 Dose:  400 mg


Insulin Aspart (Novolog Vial Sliding Scale -)  1 vial SQ TIDAC [facility]; Protocol


   Last Admin: 05/30/18 06:01 Dose:  Not Given


Methylprednisolone Sodium Succinate (Solu-Medrol -)  40 mg IVPUSH Q12H [facility]


   Stop: 05/30/18 23:00


   Last Admin: 05/30/18 10:23 Dose:  40 mg


Montelukast Sodium (Singulair -)  10 mg PO HS [facility]


   Last Admin: 05/29/18 21:20 Dose:  10 mg


Nicotine (Nicoderm Patch -)  21 mg TD DAILY [facility]


   Last Admin: 05/30/18 10:25 Dose:  21 mg


Nystatin (Nystatin Oral Suspension -)  500,000 units PO Q6HPO [facility]


   Last Admin: 05/30/18 05:49 Dose:  500,000 units


Pantoprazole Sodium (Protonix -)  40 mg PO DAILY [facility]


   Last Admin: 05/30/18 10:24 Dose:  40 mg


Prednisone (Deltasone -)  60 mg PO DAILY [facility]











- Objective


Vital Signs: 


 Vital Signs











Temperature  98 F   05/30/18 09:00


 


Pulse Rate  78   05/30/18 09:00


 


Respiratory Rate  18   05/30/18 09:00


 


Blood Pressure  140/86   05/30/18 09:00


 


O2 Sat by Pulse Oximetry (%)  100   05/29/18 21:00











Constitutional: Yes: No Distress, Calm


Cardiovascular: Yes: Regular Rate and Rhythm, S1, S2


Respiratory: Yes: Regular, Wheezes (expiratory).  No: Rhonchi


Gastrointestinal: Yes: Normal Bowel Sounds, Soft.  No: Tenderness


Edema: No


Neurological: Yes: Alert, Oriented


Labs: 


 CBC, BMP





 05/26/18 06:10 





 05/29/18 06:00 











Problem List





- Problems


(1) Alcohol abuse


Code(s): F10.10 - ALCOHOL ABUSE, UNCOMPLICATED   





(2) Acute asthma exacerbation


Code(s): J45.901 - UNSPECIFIED ASTHMA WITH (ACUTE) EXACERBATION   


Qualifiers: 


   Asthma severity: unspecified severity   Asthma persistence: unspecified   

Qualified Code(s): J45.901 - Unspecified asthma with (acute) exacerbation   





(3) PNA (pneumonia)


Code(s): J18.9 - PNEUMONIA, UNSPECIFIED ORGANISM   


Qualifiers: 


   Pneumonia type: due to unspecified organism   Laterality: unspecified 

laterality   Lung location: unspecified part of lung   Qualified Code(s): J18.9 

- Pneumonia, unspecified organism   





(4) Tachycardia


Code(s): R00.0 - TACHYCARDIA, UNSPECIFIED   





(5) Thrush, oral


Code(s): B37.0 - CANDIDAL STOMATITIS   





(6) Hyponatremia


Code(s): E87.1 - HYPO-OSMOLALITY AND HYPONATREMIA   





(7) GERD (gastroesophageal reflux disease)


Code(s): K21.9 - GASTRO-ESOPHAGEAL REFLUX DISEASE WITHOUT ESOPHAGITIS   





(8) Elevated LFTs


Code(s): R79.89 - OTHER SPECIFIED ABNORMAL FINDINGS OF BLOOD CHEMISTRY   





(9) Elevated fasting glucose


Code(s): R73.01 - IMPAIRED FASTING GLUCOSE   





(10) Acute bronchitis


Code(s): J20.9 - ACUTE BRONCHITIS, UNSPECIFIED   





(11) COPD with acute exacerbation


Code(s): J44.1 - CHRONIC OBSTRUCTIVE PULMONARY DISEASE W (ACUTE) EXACERBATION   





Assessment/Plan





Admitted to monitor bed (pt with respiratory disease, requires Librium for 

alcohol withdrawing; to monitor for respiratory decompensation).


IV solu-medrol


Duoned nebulized


Pulmonary consult appreciated


Nystatin oral solution


Nicotine patch


AM labs


Transfer top medical floor


Case was d/w pt's nurse

## 2018-05-30 NOTE — PN
Progress Note, Physician


History of Present Illness: 





pulmonary





alert,oob-chair,feeling better,dyspnea improving.





- Current Medication List


Current Medications: 


Active Medications





Acetaminophen (Tylenol -)  650 mg PO Q6H PRN


   PRN Reason: PAIN


   Last Admin: 05/30/18 03:56 Dose:  650 mg


Albuterol Sulfate (Ventolin 0.083% Nebulizer Soln -)  1 amp NEB Q4H PRN


   PRN Reason: SHORT OF BREATH/WHEEZING


   Last Admin: 05/30/18 04:06 Dose:  1 amp


Albuterol/Ipratropium (Duoneb -)  1 amp NEB RQID [facility]


   Last Admin: 05/30/18 07:45 Dose:  1 amp


Budesonide/Formoterol Fumarate (Symbicort 80/4.5mcg -)  2 puff IH BID [facility]


   Last Admin: 05/30/18 10:23 Dose:  2 puff


Chlordiazepoxide HCl (Librium -)  25 mg PO Q6H PRN


   PRN Reason: WITHDRAWAL(CONT SUBST)


   Last Admin: 05/29/18 21:21 Dose:  25 mg


Chlordiazepoxide HCl (Librium -)  20 mg PO Q9L-LVX [facility]


   Stop: 05/31/18 10:59


   Last Admin: 05/30/18 05:48 Dose:  20 mg


Chlordiazepoxide HCl (Librium -)  10 mg PO [id number] [facility]


   Stop: 06/02/18 10:59


Chlordiazepoxide HCl (Librium -)  10 mg PO 0300,1100,1900 [facility]


   Stop: 06/04/18 10:59


Chlordiazepoxide HCl (Librium -)  10 mg PO Q12H [facility]


   Stop: 06/06/18 10:59


Chlordiazepoxide HCl (Librium -)  10 mg PO Q24H [facility]


   Stop: 06/08/18 10:59


Escitalopram Oxalate (Lexapro -)  10 mg PO DAILY [facility]


   Last Admin: 05/30/18 10:25 Dose:  10 mg


Ibuprofen (Motrin -)  400 mg PO Q6H PRN


   PRN Reason: PAIN SCALE 1-6


   Last Admin: 05/30/18 10:24 Dose:  400 mg


Insulin Aspart (Novolog Vial Sliding Scale -)  1 vial SQ TIDAC [facility]; Protocol


   Last Admin: 05/30/18 06:01 Dose:  Not Given


Methylprednisolone Sodium Succinate (Solu-Medrol -)  40 mg IVPUSH Q12H [facility]


   Last Admin: 05/30/18 10:23 Dose:  40 mg


Montelukast Sodium (Singulair -)  10 mg PO HS [facility]


   Last Admin: 05/29/18 21:20 Dose:  10 mg


Nicotine (Nicoderm Patch -)  21 mg TD DAILY [facility]


   Last Admin: 05/30/18 10:25 Dose:  21 mg


Nystatin (Nystatin Oral Suspension -)  500,000 units PO Q6HPO [facility]


   Last Admin: 05/30/18 05:49 Dose:  500,000 units


Pantoprazole Sodium (Protonix -)  40 mg PO DAILY [facility]


   Last Admin: 05/30/18 10:24 Dose:  40 mg











- Objective


Vital Signs: 


 Vital Signs











Temperature  98 F   05/30/18 09:00


 


Pulse Rate  78   05/30/18 09:00


 


Respiratory Rate  18   05/30/18 09:00


 


Blood Pressure  140/86   05/30/18 09:00


 


O2 Sat by Pulse Oximetry (%)  100   05/29/18 21:00











Constitutional: Yes: Well Nourished, Calm


Eyes: Yes: WNL


HENT: Yes: WNL


Neck: Yes: WNL


Cardiovascular: Yes: Regular Rate and Rhythm, S1, S2


Respiratory: Yes: Wheezes (few wheezes)


Gastrointestinal: Yes: Normal Bowel Sounds, Soft


Extremities: Yes: WNL


Edema: No


Labs: 


 CBC, BMP





 05/26/18 06:10 





 05/29/18 06:00 











- ....Imaging


Cat Scan: Report Reviewed, Image Reviewed (pulmonary nodules stable.+ renal 

stones)





Assessment/Plan





Assessment/Plan





Acute COPD Exacerbation improving


Alcohol Abuse


Elevated LFTs improving


Smoker





-  taper medrol


-  prednisone in am


-  inhaled bronchodilators standing and PRN


-  O2 as needed


-  trend LFTs


-  smoking cessation


-  DVT prophylaxis


- pfts outpatient


- yearly low dose chest ct for lung cancer screening











DR LYNCH

## 2018-05-31 VITALS — TEMPERATURE: 98.7 F | SYSTOLIC BLOOD PRESSURE: 144 MMHG | HEART RATE: 88 BPM | DIASTOLIC BLOOD PRESSURE: 80 MMHG

## 2018-05-31 LAB
ANION GAP SERPL CALC-SCNC: 8 MMOL/L (ref 8–16)
BUN SERPL-MCNC: 30 MG/DL (ref 7–18)
CALCIUM SERPL-MCNC: 8.5 MG/DL (ref 8.5–10.1)
CHLORIDE SERPL-SCNC: 101 MMOL/L (ref 98–107)
CO2 SERPL-SCNC: 26 MMOL/L (ref 21–32)
CREAT SERPL-MCNC: 1.2 MG/DL (ref 0.7–1.3)
GLUCOSE SERPL-MCNC: 158 MG/DL (ref 74–106)
POTASSIUM SERPLBLD-SCNC: 4.5 MMOL/L (ref 3.5–5.1)
SODIUM SERPL-SCNC: 135 MMOL/L (ref 136–145)

## 2018-05-31 RX ADMIN — IBUPROFEN PRN MG: 400 TABLET, FILM COATED ORAL at 07:40

## 2018-05-31 RX ADMIN — INSULIN ASPART SCH: 100 INJECTION, SOLUTION INTRAVENOUS; SUBCUTANEOUS at 06:32

## 2018-05-31 RX ADMIN — ESCITALOPRAM OXALATE SCH MG: 10 TABLET, FILM COATED ORAL at 09:42

## 2018-05-31 RX ADMIN — BUDESONIDE AND FORMOTEROL FUMARATE DIHYDRATE SCH PUFF: 80; 4.5 AEROSOL RESPIRATORY (INHALATION) at 09:41

## 2018-05-31 RX ADMIN — NYSTATIN SCH UNITS: 100000 SUSPENSION ORAL at 05:47

## 2018-05-31 RX ADMIN — NICOTINE SCH MG: 21 PATCH TRANSDERMAL at 09:42

## 2018-05-31 RX ADMIN — NYSTATIN SCH UNITS: 100000 SUSPENSION ORAL at 11:42

## 2018-05-31 RX ADMIN — INSULIN ASPART SCH: 100 INJECTION, SOLUTION INTRAVENOUS; SUBCUTANEOUS at 11:45

## 2018-05-31 RX ADMIN — ACETAMINOPHEN PRN MG: 325 TABLET ORAL at 03:57

## 2018-05-31 RX ADMIN — IPRATROPIUM BROMIDE AND ALBUTEROL SULFATE SCH: .5; 3 SOLUTION RESPIRATORY (INHALATION) at 07:46

## 2018-05-31 RX ADMIN — ALBUTEROL SULFATE PRN AMP: 2.5 SOLUTION RESPIRATORY (INHALATION) at 06:30

## 2018-05-31 RX ADMIN — IPRATROPIUM BROMIDE AND ALBUTEROL SULFATE SCH AMP: .5; 3 SOLUTION RESPIRATORY (INHALATION) at 11:12

## 2018-05-31 RX ADMIN — PANTOPRAZOLE SODIUM SCH MG: 40 TABLET, DELAYED RELEASE ORAL at 09:42

## 2018-05-31 NOTE — DS
Physical Examination


Vital Signs: 


 Vital Signs











Temperature  98.7 F   05/31/18 10:00


 


Pulse Rate  88   05/31/18 10:00


 


Respiratory Rate  18   05/31/18 10:00


 


Blood Pressure  144/80   05/31/18 10:00


 


O2 Sat by Pulse Oximetry (%)  98   05/31/18 09:00











Findings/Remarks: 





Pt w/o SOB, wheezing, CP, palpitations.


Pt's hand shaking is at [place] (on Librium).


Constitutional: Yes: No Distress, Calm


Cardiovascular: Yes: Regular Rate and Rhythm, S1, S2


Respiratory: Yes: Regular, CTA Bilaterally, Other (coarse BS).  No: Wheezes


Gastrointestinal: Yes: Normal Bowel Sounds, Soft, Abdomen, Obese.  No: 

Tenderness


Edema: No


Neurological: Yes: Alert, Oriented


Labs: 


 CBC, BMP





 05/26/18 06:10 





 05/31/18 06:15 











Discharge Summary


Reason For Visit: PNEUMONIA, ASTHMA W.ACUTE EXACERBATION


Current Active Problems





Acute asthma exacerbation (Acute)


Acute bronchitis (Acute)


Alcohol abuse (Acute)


COPD with acute exacerbation (Acute)


Elevated LFTs (Acute)


Elevated fasting glucose (Acute)


GERD (gastroesophageal reflux disease) (Acute)


Hyponatremia (Acute)


PNA (pneumonia) (Acute)


Smoker (Acute)


Tachycardia (Acute)


Thrush, oral (Acute)








Hospital Course: 





Pt came to ER c/o wheezing; also pt stated that is abusing alcohol (drinking a 

gallon of wine daily). Pt was admitted for acute asthma exacerbation and 

alcohol withdrawal. Pt was started on IV steroids and low dose Librium, as was 

monitored for respiratory distress; pt's dose of Librium was increased 

gradually as alcohol withdrawal became more symptomatic and pulmonary function 

was improving. Pt was noticed to have elevated fasting Glucose, probable 

secondary to IV steroids- to be monitored as outpatient. Pt was seen in consult 

by Pulmonary (Dr. Madrigal). Pt's condition started to improve,  steroid and 

Librium dose was belen. Pt with depression, was seen by Psychiatry (Dr. Guzman

), started on Lexapro ( to be in creased in 1-2 weeks). Pt to be DC'ed home on 

Prednisone, Librium (both belen schedule).


Condition: Improved





- Instructions


Diet, Activity, Other Instructions: 


NCS diet. 


Disposition: HOME





- Home Medications


Comprehensive Discharge Medication List: 


Ambulatory Orders

## 2020-10-21 ENCOUNTER — HOSPITAL ENCOUNTER (EMERGENCY)
Dept: HOSPITAL 74 - JER | Age: 55
Discharge: HOME | End: 2020-10-21
Payer: COMMERCIAL

## 2020-10-21 VITALS — DIASTOLIC BLOOD PRESSURE: 90 MMHG | HEART RATE: 56 BPM | SYSTOLIC BLOOD PRESSURE: 148 MMHG

## 2020-10-21 VITALS — BODY MASS INDEX: 30.5 KG/M2

## 2020-10-21 VITALS — TEMPERATURE: 98.6 F

## 2020-10-21 DIAGNOSIS — G44.209: Primary | ICD-10-CM

## 2020-10-21 PROCEDURE — 3E033GC INTRODUCTION OF OTHER THERAPEUTIC SUBSTANCE INTO PERIPHERAL VEIN, PERCUTANEOUS APPROACH: ICD-10-PCS | Performed by: NURSE PRACTITIONER

## 2020-10-21 PROCEDURE — 3E033NZ INTRODUCTION OF ANALGESICS, HYPNOTICS, SEDATIVES INTO PERIPHERAL VEIN, PERCUTANEOUS APPROACH: ICD-10-PCS | Performed by: NURSE PRACTITIONER

## 2020-10-21 PROCEDURE — 3E0337Z INTRODUCTION OF ELECTROLYTIC AND WATER BALANCE SUBSTANCE INTO PERIPHERAL VEIN, PERCUTANEOUS APPROACH: ICD-10-PCS | Performed by: NURSE PRACTITIONER

## 2020-10-21 NOTE — PDOC
History of Present Illness





- General


Chief Complaint: Headache


Stated Complaint: SENT BY PCP/HEADACHE


Time Seen by Provider: 10/21/20 20:07


History Source: Patient


Exam Limitations: No Limitations





- History of Present Illness


Initial Comments: 





10/21/20 22:38


Is a 55-year-old male past medical history hypertension hyperlipidemia 

presenting to the ED with intermittent headache for 2 weeks.  Headache not 

associated with lacrimation, fever, vomiting, changes in vision, photophobia or 

neck stiffness; not maximal intensity at onset and non-exertional at onset.  

Patient states the headache started approximately 2 weeks ago when his allergies

became worse and attributes it to sinus congestion.  Patient took Motrin and 

Tylenol with only symptomatic relief.  Pt otherwise denies: fevers, chills, 

syncope, lightheadedness, dizziness,  neck pain, chest pain, shortness of 

breath, palpitations, back pain, abdominal pain, nausea, vomiting, diarrhea, 

constipation.


10/21/20 22:40








Past History





- Medical History


Allergies/Adverse Reactions: 


                                    Allergies











Allergy/AdvReac Type Severity Reaction Status Date / Time


 


No Known Allergies Allergy   Verified 10/21/20 20:10











Home Medications: 


Ambulatory Orders





Montelukast Sodium [Singulair] 10 mg PO DAILY 05/25/18 


Acetaminophen [Tylenol .Regular Strength -] 650 mg PO Q6H PRN  tablet 05/31/18 


Albuterol Sulfate Inhaler - [Ventolin HFA Inhaler -] 2 inh PO Q6H PRN #1 inh 

05/31/18 


Budesonide/Formeterol Fumarate [SYMBICORT 80/4.5mcg -] 2 puff IH BID #1 inhaler 

05/31/18 


Chlordiazepoxide [Librium -] See Taper PO E0G-IUP #30 capsule MDD 4 05/31/18 


Escitalopram Oxalate [Lexapro -] 10 mg PO DAILY #30 tablet 05/31/18 


Ibuprofen [Motrin -] 400 mg PO Q6H PRN  tablet 05/31/18 


Nicotine Patch [Nicoderm Patch -] 21 mg TD DAILY #30 patch 05/31/18 


Nystatin Oral Suspension - [Nystatin Oral Susp 493464 Units/5 ML -] 5 ml PO 

Q6HPO #400 ml MDD 4 05/31/18 


Pantoprazole Sodium [Protonix -] 40 mg PO DAILY #14 tablet.ec MDD 1 05/31/18 


predniSONE [Deltasone -] See Taper PO DAILY #17 tablet MDD 3 05/31/18 


Amox-Tr/K Cl [Augmentin - 875Mg Tablet] 1 tab PO BID #14 tablet 10/21/20 








Asthma: Yes


COPD: No


Psychiatric Problems: Yes (drug and etoh abuse)





- Psycho-Social/Smoking History


Smoking Status: Yes


Smoking History: Never smoked


Have you smoked in the past 12 months: Yes


Number of Cigarettes Smoked Daily: 30


'Breaking Loose' booklet given: 05/25/18





- Substance Abuse Hx (Audit-C & DAST Scrn)


How often the patient has a drink containing alcohol: Never


Score: In Men: 4 or > Positive; In Women: 3 or > Positive: 0


Screen Result (Pos requires Nsg. Audit-10AR): Negative


In the last yr the pt used illegal drug/Rx for NonMed reason: No


Score:  Yes response is considered Positive: 0


Screen Result (Positive result requires Nsg. DAST-10): Negative





*Physical Exam





- Vital Signs


                                Last Vital Signs











Temp Pulse Resp BP Pulse Ox


 


 98.6 F   73   18   163/88   99 


 


 10/21/20 20:08  10/21/20 20:08  10/21/20 20:08  10/21/20 20:08  10/21/20 20:08














- Physical Exam





10/21/20 22:41


Gen: AAOx 3, no acute distress, comfortable, no signs of respiratory distress 


HENT: atraumatic, normocephalic with no laceration or contusion. Nasal mucosa 

without erythema. Oropharynx without erythema or exudates. Mucous membranes 

moist. 


EYES: PERRL, EOM intact, conjunctiva pink 


NECK: supple; trachea midline; no JVD, no lymphadenopathy, or thyromegaly


CV: RRR no murmurs, gallops, or rubs.


CHEST: CTA b/l no wheezing, rales or rhonchi


ABD: +BS/ND. no TTP; soft, no rebound, no guarding


EXTREMITY: no cyanosis or erythema. 2+ dorsalis pedis, posterior tibial, and 

radial pulse. No pedal edema; no calf swelling or tenderness 


SKIN: no rash, warm and dry, no diaphoresis 


HEME: no purpura or ecchymosis


NEURO: normal speech, CN II-XII intact, sensation intact, normal gait, able to 

tip toe and heel walk, romberg and pronator drift absent, no cerebellar 

deficits, normal finger to nose, heel to shin, rapid alternating movements, no 

tremor, no dysmetria


MS: 5/5 strength in all extremities, FROM intact in all extremities.








ED Treatment Course





- RADIOLOGY


Radiology Studies Ordered: 














 Category Date Time Status


 


 HEAD CT WITHOUT CONTRAST [CT] Stat CT Scan  10/21/20 20:28 Taken














- Medications


Given in the ED: 


ED Medications














Discontinued Medications














Generic Name Dose Route Start Last Admin





  Trade Name Josefa  PRN Reason Stop Dose Admin


 


Diphenhydramine HCl  25 mg  10/21/20 20:09  10/21/20 22:09





  Benadryl Injection -  IVPUSH  10/21/20 20:10  25 mg





  ONCE ONE   Administration


 


Sodium Chloride  1,000 mls @ 1,000 mls/hr  10/21/20 20:09  10/21/20 22:09





  Normal Saline -  IV  10/21/20 21:08  1,000 mls/hr





  ASDIR STA   Administration


 


Ketorolac Tromethamine  30 mg  10/21/20 20:09  10/21/20 22:09





  Toradol Injection -  IVPUSH  10/21/20 20:10  30 mg





  ONCE ONE   Administration


 


Metoclopramide HCl  10 mg  10/21/20 20:09  10/21/20 22:09





  Reglan Injection -  IVPUSH  10/21/20 20:10  10 mg





  ONCE ONE   Administration














Medical Decision Making





- Medical Decision Making





10/21/20 22:48


For 55-year-old male with sinus-like headache


We will give Benadryl Toradol Reglan and normal saline as well as obtain CT head


Will reassess based on results





Patient states that he feels much better with medicine





CT shows no evidence of acute intracranial pathology however the sinonasal 

disease present in  the sphenoid frontal and ethmoid sinuses.  Since has been 

going on for greater than 2 weeks I will discharge the patient on antibiotics 

with ENT follow-up.





Pt appears well and is safe and stable for discharge with strict return 

precautions including signs and symptoms requring immediate return to the ED





Supportive care instructions explained and given to pt.  Reasons to return 

emergently to ER explained and given. Importance of follow up with PMD and other

 specialists as indicated stressed to pt. Pt verbalized understanding of 

instructions.  Pt to follow up with PMD in 2 days.








Discharge





- Discharge Information


Problems reviewed: Yes


Clinical Impression/Diagnosis: 


Headache


Qualifiers:


 Headache type: tension-type Headache chronicity pattern: unspecified pattern 

Intractability: not intractable Qualified Code(s): G44.209 - Tension-type 

headache, unspecified, not intractable





Condition: Stable


Disposition: HOME





- Additional Discharge Information


Prescriptions: 


Amox-Tr/K Cl [Augmentin - 875Mg Tablet] 1 tab PO BID #14 tablet





- Follow up/Referral


Referrals: 


José Gibbons MD [Primary Care Provider] - 





- Patient Discharge Instructions


Patient Printed Discharge Instructions:  DI for Sinusitis





- Post Discharge Activity

## 2020-10-21 NOTE — PDOC
Rapid Medical Evaluation


Time Seen by Provider: 10/21/20 20:07


Medical Evaluation: 


                                    Allergies











Allergy/AdvReac Type Severity Reaction Status Date / Time


 


No Known Allergies Allergy   Verified 05/25/18 14:34











10/21/20 20:07


I have performed a brief in person evaluation of this patient.





CC: coronal and retro-orbital HA x2 weeks





PE: Neuro grossly normal





Orders: NS, reglan, benadryl, toradol





Patient will proceed to ED for further evaluation.





**Discharge Disposition





- Diagnosis


 Headache








- Referrals





- Patient Instructions





- Post Discharge Activity

## 2025-06-17 ENCOUNTER — HOSPITAL ENCOUNTER (OUTPATIENT)
Dept: HOSPITAL 74 - JER | Age: 60
Setting detail: OBSERVATION
LOS: 1 days | Discharge: HOME | End: 2025-06-18
Attending: INTERNAL MEDICINE | Admitting: HOSPITALIST
Payer: COMMERCIAL

## 2025-06-17 VITALS — BODY MASS INDEX: 29.8 KG/M2

## 2025-06-17 DIAGNOSIS — J45.909: ICD-10-CM

## 2025-06-17 DIAGNOSIS — R51.9: ICD-10-CM

## 2025-06-17 DIAGNOSIS — H53.8: ICD-10-CM

## 2025-06-17 DIAGNOSIS — K76.0: ICD-10-CM

## 2025-06-17 DIAGNOSIS — E78.5: ICD-10-CM

## 2025-06-17 DIAGNOSIS — I10: ICD-10-CM

## 2025-06-17 DIAGNOSIS — F17.210: ICD-10-CM

## 2025-06-17 DIAGNOSIS — R07.89: Primary | ICD-10-CM

## 2025-06-17 LAB
ALBUMIN SERPL-MCNC: 4.2 G/DL (ref 3.4–5)
ALP SERPL-CCNC: 53 U/L (ref 45–117)
ALT SERPL-CCNC: 49 U/L (ref 13–61)
ANION GAP SERPL CALC-SCNC: 4 MMOL/L (ref 4–13)
APTT BLD: 31.3 SECONDS (ref 25.2–36.5)
AST SERPL-CCNC: 32 U/L (ref 15–37)
BASOPHILS # BLD AUTO: 0.07 X10^3/UL (ref 0.01–0.08)
BILIRUB SERPL-MCNC: 0.6 MG/DL (ref 0.2–1)
BUN SERPL-MCNC: 17.3 MG/DL (ref 7–18)
CALCIUM SERPL-MCNC: 9.9 MG/DL (ref 8.5–10.1)
CHLORIDE SERPL-SCNC: 104 MMOL/L (ref 98–107)
CO2 SERPL-SCNC: 32 MMOL/L (ref 21–32)
CREAT SERPL-MCNC: 1.2 MG/DL (ref 0.55–1.3)
EOSINOPHIL # BLD AUTO: 0.27 X10^3/UL (ref 0.04–0.54)
EOSINOPHIL NFR BLD AUTO: 3 % (ref 0.8–7)
ERYTHROCYTE [DISTWIDTH] IN BLOOD: 11.9 % (ref 12.2–16.1)
GLUCOSE SERPL-MCNC: 75 MG/DL (ref 74–106)
HCT VFR BLD CALC: 46.2 % (ref 40.1–51)
HCV IGG SERPL QL IA: (no result)
HGB BLD-MCNC: 15.3 G/DL (ref 13.7–17.5)
HIV 1+2 AB+HIV1 P24 AG SERPL QL IA: NEGATIVE
IMM GRANULOCYTES # BLD: 0.04 X10^3/UL (ref 0–0.03)
INR BLD: 1.04 (ref 0.83–1.09)
MAGNESIUM SERPL-MCNC: 2.3 MG/DL (ref 1.8–2.4)
MCHC RBC-ENTMCNC: 33.1 G/DL (ref 32.3–36.5)
MCV RBC: 92.6 FL (ref 79–92.2)
MONOCYTES # BLD AUTO: 0.99 X10^3/UL (ref 0.3–0.82)
MONOCYTES NFR BLD AUTO: 10.8 % (ref 5.3–12.2)
PLATELET # BLD AUTO: 222 X10^3/UL (ref 163–337)
PLATELETS.RETICULATED NFR BLD AUTO: (no result) % (ref 0.9–11.2)
PLATELETS.RETICULATED NFR BLD AUTO: (no result) X10^3/UL
PMV BLD: 10.5 FL (ref 9.4–12.4)
POTASSIUM SERPLBLD-SCNC: 4.6 MMOL/L (ref 3.5–5.1)
PROT SERPL-MCNC: 7.6 G/DL (ref 6.4–8.2)
PT PNL PPP: 11.4 SEC (ref 9.7–13)
SODIUM SERPL-SCNC: 140 MMOL/L (ref 136–145)

## 2025-06-17 PROCEDURE — 3E0F7GC INTRODUCTION OF OTHER THERAPEUTIC SUBSTANCE INTO RESPIRATORY TRACT, VIA NATURAL OR ARTIFICIAL OPENING: ICD-10-PCS | Performed by: INTERNAL MEDICINE

## 2025-06-17 PROCEDURE — 3E023GC INTRODUCTION OF OTHER THERAPEUTIC SUBSTANCE INTO MUSCLE, PERCUTANEOUS APPROACH: ICD-10-PCS | Performed by: INTERNAL MEDICINE

## 2025-06-17 PROCEDURE — G0378 HOSPITAL OBSERVATION PER HR: HCPCS

## 2025-06-17 PROCEDURE — 3E033NZ INTRODUCTION OF ANALGESICS, HYPNOTICS, SEDATIVES INTO PERIPHERAL VEIN, PERCUTANEOUS APPROACH: ICD-10-PCS | Performed by: INTERNAL MEDICINE

## 2025-06-17 RX ADMIN — ACETAMINOPHEN ONE MG: 10 INJECTION, SOLUTION INTRAVENOUS at 18:00

## 2025-06-17 RX ADMIN — IPRATROPIUM BROMIDE AND ALBUTEROL SULFATE SCH AMP: .5; 3 SOLUTION RESPIRATORY (INHALATION) at 18:00

## 2025-06-17 RX ADMIN — NICOTINE ONE MG: 14 PATCH, EXTENDED RELEASE TRANSDERMAL at 23:34

## 2025-06-18 VITALS — TEMPERATURE: 97.9 F | RESPIRATION RATE: 18 BRPM | HEART RATE: 80 BPM

## 2025-06-18 VITALS — SYSTOLIC BLOOD PRESSURE: 122 MMHG | DIASTOLIC BLOOD PRESSURE: 82 MMHG

## 2025-06-18 LAB
ALBUMIN SERPL-MCNC: 4.1 G/DL (ref 3.4–5)
ALP SERPL-CCNC: 51 U/L (ref 45–117)
ALT SERPL-CCNC: 46 U/L (ref 13–61)
ANION GAP SERPL CALC-SCNC: 5 MMOL/L (ref 4–13)
AST SERPL-CCNC: 27 U/L (ref 15–37)
BILIRUB SERPL-MCNC: 0.7 MG/DL (ref 0.2–1)
BUN SERPL-MCNC: 13 MG/DL (ref 7–18)
CALCIUM SERPL-MCNC: 9.3 MG/DL (ref 8.5–10.1)
CHLORIDE SERPL-SCNC: 103 MMOL/L (ref 98–107)
CO2 SERPL-SCNC: 31 MMOL/L (ref 21–32)
CREAT SERPL-MCNC: 1 MG/DL (ref 0.55–1.3)
ERYTHROCYTE [DISTWIDTH] IN BLOOD: 11.9 % (ref 12.2–16.1)
GLUCOSE SERPL-MCNC: 115 MG/DL (ref 74–106)
HCT VFR BLD CALC: 44.3 % (ref 40.1–51)
HGB BLD-MCNC: 14.7 G/DL (ref 13.7–17.5)
MCHC RBC-ENTMCNC: 33.2 G/DL (ref 32.3–36.5)
MCV RBC: 92.1 FL (ref 79–92.2)
PLATELET # BLD AUTO: 191 X10^3/UL (ref 163–337)
PLATELETS.RETICULATED NFR BLD AUTO: (no result) % (ref 0.9–11.2)
PLATELETS.RETICULATED NFR BLD AUTO: (no result) X10^3/UL
PMV BLD: 10.7 FL (ref 9.4–12.4)
POTASSIUM SERPLBLD-SCNC: 4.2 MMOL/L (ref 3.5–5.1)
PROT SERPL-MCNC: 7 G/DL (ref 6.4–8.2)
SODIUM SERPL-SCNC: 139 MMOL/L (ref 136–145)

## 2025-06-18 RX ADMIN — ENOXAPARIN SODIUM SCH MG: 40 INJECTION SUBCUTANEOUS at 09:27

## 2025-06-18 RX ADMIN — ATORVASTATIN CALCIUM SCH MG: 40 TABLET, FILM COATED ORAL at 04:23

## 2025-06-18 RX ADMIN — ASPIRIN 81 MG SCH MG: 81 TABLET ORAL at 09:27

## 2025-06-18 RX ADMIN — NICOTINE SCH MG: 14 PATCH, EXTENDED RELEASE TRANSDERMAL at 09:27

## 2025-06-18 RX ADMIN — PREDNISONE SCH MG: 20 TABLET ORAL at 09:27

## 2025-06-18 RX ADMIN — ALBUTEROL SULFATE SCH PUFF: 90 AEROSOL, METERED RESPIRATORY (INHALATION) at 01:51

## 2025-06-18 RX ADMIN — BUDESONIDE AND FORMOTEROL FUMARATE DIHYDRATE SCH PUFF: 80; 4.5 AEROSOL RESPIRATORY (INHALATION) at 02:24
